# Patient Record
Sex: MALE | Race: WHITE | Employment: FULL TIME | URBAN - METROPOLITAN AREA
[De-identification: names, ages, dates, MRNs, and addresses within clinical notes are randomized per-mention and may not be internally consistent; named-entity substitution may affect disease eponyms.]

---

## 2017-03-17 DIAGNOSIS — E03.9 HYPOTHYROIDISM: ICD-10-CM

## 2017-03-19 RX ORDER — LEVOTHYROXINE SODIUM 75 UG/1
TABLET ORAL
Qty: 90 TABLET | Refills: 0 | Status: SHIPPED
Start: 2017-03-19 | End: 2017-06-22

## 2017-03-20 NOTE — TELEPHONE ENCOUNTER
LEVOTHROID) 75 MCG       Last Written Prescription Date:  3/12/16  Last Fill Quantity: 90,   # refills: 3  Last Office Visit : 1/15/16  Future Office visit:  NONE

## 2017-06-22 DIAGNOSIS — E03.9 HYPOTHYROIDISM: ICD-10-CM

## 2017-06-22 RX ORDER — LEVOTHYROXINE SODIUM 75 UG/1
75 TABLET ORAL
Qty: 90 TABLET | Refills: 0 | Status: SHIPPED | OUTPATIENT
Start: 2017-06-22 | End: 2017-10-11

## 2017-06-22 NOTE — TELEPHONE ENCOUNTER
levothyroxine 75 MCG        Last Written Prescription Date:  3/19/17  Last Fill Quantity: 90,   # refills: 0  Last Office Visit : 1/15/16  Future Office visit:  NONE    Routing refill request to provider for review/approval because: OVER DUE MD APPT.   LETTERS SENT + 90 DAY RF  NO PT F/U

## 2017-10-11 DIAGNOSIS — E03.9 HYPOTHYROIDISM: ICD-10-CM

## 2017-10-14 NOTE — TELEPHONE ENCOUNTER
levothyroxine  75 MCG      Last Written Prescription Date:  6/22/17  Last Fill Quantity: 90,   # refills: 0  Last Office Visit : 1/15/16  Future Office visit:  NONE  Routing refill request to provider for review/approval because:  OVER DUE APPT. + 21 MOS   REMINDER SENT + 90 DAY RFs   RF?    NO APPT. F/U

## 2017-10-16 RX ORDER — LEVOTHYROXINE SODIUM 75 UG/1
75 TABLET ORAL DAILY
Qty: 90 TABLET | Refills: 0 | Status: SHIPPED | OUTPATIENT
Start: 2017-10-16 | End: 2018-01-19

## 2018-01-09 DIAGNOSIS — E03.9 HYPOTHYROIDISM: ICD-10-CM

## 2018-01-10 RX ORDER — LEVOTHYROXINE SODIUM 75 UG/1
75 TABLET ORAL DAILY
Qty: 90 TABLET | Refills: 0 | Status: CANCELLED | OUTPATIENT
Start: 2018-01-10

## 2018-01-10 NOTE — TELEPHONE ENCOUNTER
levothyroxine (SYNTHROID/LEVOTHROID) 75 MCG tablet   Last Written Prescription Date:  10/16/17  Last Fill Quantity: 90,   # refills: 0  Last Office Visit :1/15/16  Future Office visit:  None    Scheduling has been notified to contact the pt for appointment.      Routing refill request to provider for review/approval because:  lv > 1 yr

## 2018-01-19 DIAGNOSIS — E03.9 HYPOTHYROIDISM: ICD-10-CM

## 2018-01-19 RX ORDER — LEVOTHYROXINE SODIUM 75 UG/1
75 TABLET ORAL DAILY
Qty: 30 TABLET | Refills: 0 | Status: SHIPPED | OUTPATIENT
Start: 2018-01-19 | End: 2018-01-25

## 2018-01-24 ENCOUNTER — OFFICE VISIT (OUTPATIENT)
Dept: ENDOCRINOLOGY | Facility: CLINIC | Age: 58
End: 2018-01-24
Payer: COMMERCIAL

## 2018-01-24 VITALS
BODY MASS INDEX: 30.31 KG/M2 | HEIGHT: 65 IN | DIASTOLIC BLOOD PRESSURE: 89 MMHG | WEIGHT: 181.9 LBS | SYSTOLIC BLOOD PRESSURE: 149 MMHG | HEART RATE: 60 BPM

## 2018-01-24 DIAGNOSIS — E05.00 GRAVES' EYE DISEASE: ICD-10-CM

## 2018-01-24 DIAGNOSIS — E89.0 POSTABLATIVE HYPOTHYROIDISM: Primary | ICD-10-CM

## 2018-01-24 DIAGNOSIS — E89.0 POSTABLATIVE HYPOTHYROIDISM: ICD-10-CM

## 2018-01-24 LAB
T4 FREE SERPL-MCNC: 0.9 NG/DL (ref 0.76–1.46)
TSH SERPL DL<=0.005 MIU/L-ACNC: 7.87 MU/L (ref 0.4–4)

## 2018-01-24 ASSESSMENT — PAIN SCALES - GENERAL: PAINLEVEL: NO PAIN (0)

## 2018-01-24 NOTE — NURSING NOTE
Chief Complaint   Patient presents with     RECHECK     ABNORMAL THYROID FUCTION     Chrissy Hernandez CMA

## 2018-01-24 NOTE — LETTER
1/24/2018       RE: Temo Jalloh  [address]  [place] [address]     Dear Colleague,    Thank you for referring your patient, Temo Jalloh, to the [facility] ENDOCRINOLOGY at [facility]. Please see a copy of my visit note below.    Endocrinology Clinic Visit 1/25/2018  NAME:  Temo Jalloh  PCP:  Sherin Crews  MRN:  [id number]  Reason for Consult: Graves disease, hypothyroidism  Requesting Provider:  Sherin Crews    Chief Complaint     Chief Complaint   Patient presents with     RECHECK     ABNORMAL THYROID FUCTION       History of Present Illness     Temo Jalloh is a 57 year old male who is seen in clinic for follow-up of Graves' disease and hypothyroidism.  Patient used to see Dr. Garsia who recently retired.  He was diagnosed with Graves' disease in 2013, and was treated with 22 mCi of radioactive iodine in August 2013, shortly after diagnosis.  He developed post ablative hypothyroidism and was started on levothyroxine, which she has been taking since at a dose of 75 mcg daily.  Last visit in    Also the patient has a history of Graves' ophthalmopathy which he had at the time of his original diagnosis.  He was initially treated with selenium, however over time he stopped it because he did not think it was helping.  His ophthalmopathy presented as proptosis and diplopia.  He received steroid courses over the last few years.  He was referred to ophthalmology ([facility]) but never went there because he was always feeling better by the time the referral was entered.     Last visit with Dr. Garsia was January 2016.  At that time he reported some diplopia, and was prescribed a prednisone taper over 2 weeks.  He tells me today that he felt better and his diplopia resolved with the prednisone taper, however he did not follow through with his eye clinic appointment because of resolution of his symptoms.  Since then he has not had  any recurrent diplopia.  He uses eyedrops as needed.  He can close his eyes completely during sleep.     Last TSH was 2.92 in January 2016.  That is reflective of levothyroxine 75 mcg daily with good compliance.    He feels fine today.  He has no complaints.  He has maintained a stable weight.  The patient denies any feeling cold, constipation, slowed thinking, dry skin, anxiousness, tremulousness, palpitations, sweating and hair loss.    No recent history of febrile illness with neck pain or sore throat.     Localized symptoms: there is no throat swelling, trouble swallowing, no SOB when lying flat, and no voice changes.     Problem List     Patient Active Problem List   Diagnosis     Allergic rhinitis     LEFT AC ARTHROSIS     Sensorineural hearing loss, asymmetrical     CARDIOVASCULAR SCREENING; LDL GOAL LESS THAN 160     Hyperthyroidism     Graves' eye disease     Hypothyroidism, postablative        Medications     Current Outpatient Prescriptions   Medication     levothyroxine (SYNTHROID/LEVOTHROID) 75 MCG tablet     predniSONE (DELTASONE) 20 MG tablet     Selenium ([facility] SELENIUM) 100 MCG TABS     [DISCONTINUED] NO ACTIVE MEDICATIONS     No current facility-administered medications for this visit.         Allergies     No Known Allergies    Medical / Surgical History     Past Medical History:   Diagnosis Date     Allergic rhinitis, cause unspecified      Hyperthyroidism 5/23/2013     Injury, other and unspecified, shoulder and upper arm     Left     Other postablative hypothyroidism 12/7/2013     Plantar fascial fibromatosis      Venereal disease, unspecified     Venereal warts     No past surgical history on file.    Social History     Social History     Social History     Marital status:      Spouse name: N/A     Number of children: N/A     Years of education: N/A     Occupational History     Not on file.     Social History Main Topics     Smoking status: Never Smoker     Smokeless tobacco: Never Used  "    Alcohol use Yes      Comment: 2 times weekly     Drug use: No     Sexual activity: Not on file     Other Topics Concern     Parent/Sibling W/ Cabg, Mi Or Angioplasty Before 65f 55m? Yes     father has 5 stents; starting at age 55     Social History Narrative       Family History     Family History   Problem Relation Age of Onset     C.A.D. Father      Hypertension Father      DIABETES Father      Breast Cancer No family hx of      Cancer - colorectal No family hx of      Thyroid Disease Mother      Thyroid Disease Sister      Thyroid Disease Maternal Uncle      Thyroid Disease Other      two nieces       ROS     Constitutional: no fevers, chills, night sweats. No weight loss/gain. No fatigue. Good appetite  Eyes: no vision changes, no eye redness, no diplopia  Ears, Nose, mouth, throat: no hearing changes, no tinnitus, no rhinorrhea, no nasal congestion, no anosmia  Cardiovascular: no chest pain, no orthopnea or PND, no edema, no palpitations  Respiratory: no dyspnea, no cough, no sputum, no wheezing  Gastrointestinal: no nausea, no vomiting, no abdominal pain, no diarrhea, no constipation  Genitourinary: no dysuria, no frequency, no urgency, no nocturia  Musculoskeletal: no joint pains, no back pain, no cramps, no fractures  Skin: no rash, no itching, no dryness, no ulcers, no hair loss, no nail changes  Neurological:no headaches, no weakness, no numbness, no tingling, no tremors, no difficulty sleeping, no snoring, no AM sleepiness  Psychiatric: no anxiety, no sadness  Hematologic/lymphatic: no easy bruising, no bleeding, no palor    Physical Exam   /89  Pulse 60  Ht 1.651 m (5' 5\")  Wt 82.5 kg (181 lb 14.4 oz)  BMI 30.27 kg/m2     General: Comfortable, no obvious distress, normal body habitus  Eyes: Sclera anicteric, moist conjunctiva, no lid lag, no exophthalmos  HENT: Atraumatic, oropharynx clear, moist mucous membranes with no mucosal ulcerations  Neck: Trachea midline, supple. Thyroid: Thyroid " is normal in size and texture  CV: Regular rhythm, normal rate. No murmurs auscultated  Resp: Clear to auscultation bilaterally, good effort  Abdomen:  Soft, non tender, non distended. Bowel sounds heard. No organomegaly. No striae  Skin: No rashes, lesions, or subcutaneous nodules.   Psych: Alert and oriented x 3. Appropriate affect, good insight  Extremities: No peripheral edema  Musculoskeletal: Appropriate muscle bulk and strength  Lymphatic: No cervical lymphadenopathy  Neuro: Moves all four extremities. No focal deficits on limited exam. Gait normal. No resting tremor, deep tendon reflexes with normal relaxation phase.     Labs/Imaging     Pertinent Labs were reviewed and updated in [facility].  Radiology Results were  reviewed and updated in EPIC.    Summary of recent findings:     TSH   Date Value Ref Range Status   01/24/2018 7.87 (H) 0.40 - 4.00 mU/L Final   01/15/2016 2.92 0.40 - 4.00 mU/L Final   03/20/2015 2.10 0.40 - 4.00 mU/L Final     Comment:     Effective 7/30/2014, the reference range for this assay has changed to reflect   new instrumentation/methodology.     07/24/2014 1.49 0.4 - 5.0 mU/L Final   06/09/2014 1.45 0.4 - 5.0 mU/L Final     T4 Free   Date Value Ref Range Status   01/24/2018 0.90 0.76 - 1.46 ng/dL Final   01/15/2016 1.05 0.76 - 1.46 ng/dL Final   03/20/2015 0.93 0.76 - 1.46 ng/dL Final     Comment:     Effective 7/30/2014, the reference range for this assay has changed to reflect   new instrumentation/methodology.     06/09/2014 1.11 0.70 - 1.85 ng/dL Final   04/07/2014 0.84 0.70 - 1.85 ng/dL Final       Impression / Plan     1. (E89.0) Postablative hypothyroidism  (primary encounter diagnosis)  Comment: He is taking 75 mcg of levothyroxine.  Likely euthyroid.  We will check his labs today.  Plan: TSH with free T4 reflex    (E05.00) Graves' eye disease  Comment: He has required 3 bursts of steroids since 2013, due to diplopia.  Has never been seen in the Graves' eye clinic.  Is currently  asymptomatic for the past 2 years.  Plan: I advised the patient to let me know if he has recurrence of any eye symptoms including pain, pressure, diplopia, more bulging, tearing, inability to close the eyes completely.  At that point I will refer him to the Graves' eye clinic.      Test and/or medications prescribed today:  Orders Placed This Encounter   Procedures     TSH with free T4 reflex         Follow up: 1 year      Clive Teague MD  Endocrinology, Diabetes and Metabolism  [facility]

## 2018-01-24 NOTE — MR AVS SNAPSHOT
After Visit Summary   1/24/2018    Temo Jalloh    MRN: [id number]           Patient Information     Date Of Birth          1960        Visit Information        Provider Department      1/24/2018 3:40 PM Clive Teague MD [facility] Endocrinology        Today's Diagnoses     Postablative hypothyroidism    -  1       Follow-ups after your visit        Follow-up notes from your care team     Return in about 1 year (around 1/24/2019).      Future tests that were ordered for you today     Open Future Orders        Priority Expected Expires Ordered    TSH with free T4 reflex Routine  1/24/2019 1/24/2018            Who to contact     Please call your clinic at [phone] to:    Ask questions about your health    Make or cancel appointments    Discuss your medicines    Learn about your test results    Speak to your doctor   If you have compliments or concerns about an experience at your clinic, or if you wish to file a complaint, please contact [facility] Patient Relations at [phone] or email us at [email or web address]         Additional Information About Your Visit        [employer] Information     Vivo gives you secure access to your electronic health record. If you see a primary care provider, you can also send messages to your care team and make appointments. If you have questions, please call your primary care clinic.  If you do not have a primary care provider, please call [phone] and they will assist you.      Vivo is an electronic gateway that provides easy, online access to your medical records. With Vivo, you can request a clinic appointment, read your test results, renew a prescription or communicate with your care team.     To access your existing account, please contact your [facility] or call [phone] for assistance.        Care EveryWhere ID     This is your Care EveryWhere ID. This could be  "used by other organizations to access your [place] medical records  [id number]        Your Vitals Were     Pulse Height BMI (Body Mass Index)             60 1.651 m (5' 5\") 30.27 kg/m2          Blood Pressure from Last 3 Encounters:   01/24/18 149/89   01/15/16 126/78   03/19/15 126/80    Weight from Last 3 Encounters:   01/24/18 82.5 kg (181 lb 14.4 oz)   01/15/16 81.4 kg (179 lb 8 oz)   03/19/15 80 kg (176 lb 4.8 oz)               Primary Care Provider Office Phone # Fax #    Sherin Crews, ANGEL [phone] [address] [facility] [address]        Equal Access to Services     YESICA STOKES : Hadii aad ku hadasho Soomaali, waaxda luqadaha, qaybta kaalmada adeegyada, fozia del toro . So [facility] [phone].    ATENCIÓN: Si habla español, tiene a perez disposición servicios gratuitos de asistencia lingüística. Ll[facility] [phone].    We comply with applicable federal civil rights laws and [place] laws. We do not discriminate on the basis of race, color, national origin, age, disability, sex, sexual orientation, or gender identity.            Thank you!     Thank you for choosing [facility] ENDOCRINOLOGY  for your care. Our goal is always to provide you with excellent care. Hearing back from our patients is one way we can continue to improve our services. Please take a few minutes to complete the written survey that you may receive in the mail after your visit with us. Thank you!             Your Updated Medication List - Protect others around you: Learn how to safely use, store and throw away your medicines at www.disposemymeds.org.          This list is accurate as of 1/24/18  3:52 PM.  Always use your most recent med list.                   Brand Name Dispense Instructions for use Diagnosis    levothyroxine 75 MCG tablet    SYNTHROID/LEVOTHROID    30 tablet    Take 1 tablet (75 mcg) by mouth daily    Hypothyroidism       predniSONE 20 MG tablet    DELTASONE    40 " tablet    Take 2 tabs (40 mg) every am for 1 week; then 1.5 tab (30 mg) daily for 1 week; then 1 tab (20 mg) daily for 1 week; then 0.5 tab (10 mg) daily for one week then stop.    Graves' eye disease       Selenium 100 MCG Tabs    [facility] SELENIUM    180 tablet    Take 100 mcg by mouth 2 times daily    Graves' eye disease, Hyperthyroidism

## 2018-01-25 DIAGNOSIS — E89.0 HYPOTHYROIDISM, POSTABLATIVE: Primary | ICD-10-CM

## 2018-01-25 RX ORDER — LEVOTHYROXINE SODIUM 88 UG/1
88 TABLET ORAL DAILY
Qty: 90 TABLET | Refills: 3 | Status: SHIPPED | OUTPATIENT
Start: 2018-01-25 | End: 2018-04-11

## 2018-01-25 NOTE — PROGRESS NOTES
Endocrinology Clinic Visit 1/25/2018  NAME:  Temo Jalloh  PCP:  Eleonora Sherin Reyesn  MRN:  [id number]  Reason for Consult: Graves disease, hypothyroidism  Requesting Provider:  Sherin Crews    Chief Complaint     Chief Complaint   Patient presents with     RECHECK     ABNORMAL THYROID FUCTION       History of Present Illness     Temo Jalloh is a 57 year old male who is seen in clinic for follow-up of Graves' disease and hypothyroidism.  Patient used to see Dr. Garsia who recently retired.  He was diagnosed with Graves' disease in 2013, and was treated with 22 mCi of radioactive iodine in August 2013, shortly after diagnosis.  He developed post ablative hypothyroidism and was started on levothyroxine, which she has been taking since at a dose of 75 mcg daily.  Last visit in    Also the patient has a history of Graves' ophthalmopathy which he had at the time of his original diagnosis.  He was initially treated with selenium, however over time he stopped it because he did not think it was helping.  His ophthalmopathy presented as proptosis and diplopia.  He received steroid courses over the last few years.  He was referred to ophthalmology ([facility]) but never went there because he was always feeling better by the time the referral was entered.     Last visit with Dr. Garsia was January 2016.  At that time he reported some diplopia, and was prescribed a prednisone taper over 2 weeks.  He tells me today that he felt better and his diplopia resolved with the prednisone taper, however he did not follow through with his eye clinic appointment because of resolution of his symptoms.  Since then he has not had any recurrent diplopia.  He uses eyedrops as needed.  He can close his eyes completely during sleep.     Last TSH was 2.92 in January 2016.  That is reflective of levothyroxine 75 mcg daily with good compliance.    He feels fine today.  He has no complaints.  He has maintained a stable  weight.  The patient denies any feeling cold, constipation, slowed thinking, dry skin, anxiousness, tremulousness, palpitations, sweating and hair loss.    No recent history of febrile illness with neck pain or sore throat.     Localized symptoms: there is no throat swelling, trouble swallowing, no SOB when lying flat, and no voice changes.     Problem List     Patient Active Problem List   Diagnosis     Allergic rhinitis     LEFT AC ARTHROSIS     Sensorineural hearing loss, asymmetrical     CARDIOVASCULAR SCREENING; LDL GOAL LESS THAN 160     Hyperthyroidism     Graves' eye disease     Hypothyroidism, postablative        Medications     Current Outpatient Prescriptions   Medication     levothyroxine (SYNTHROID/LEVOTHROID) 75 MCG tablet     predniSONE (DELTASONE) 20 MG tablet     Selenium ([facility]S SELENIUM) 100 MCG TABS     [DISCONTINUED] NO ACTIVE MEDICATIONS     No current facility-administered medications for this visit.         Allergies     No Known Allergies    Medical / Surgical History     Past Medical History:   Diagnosis Date     Allergic rhinitis, cause unspecified      Hyperthyroidism 5/23/2013     Injury, other and unspecified, shoulder and upper arm     Left     Other postablative hypothyroidism 12/7/2013     Plantar fascial fibromatosis      Venereal disease, unspecified     Venereal warts     No past surgical history on file.    Social History     Social History     Social History     Marital status:      Spouse name: N/A     Number of children: N/A     Years of education: N/A     Occupational History     Not on file.     Social History Main Topics     Smoking status: Never Smoker     Smokeless tobacco: Never Used     Alcohol use Yes      Comment: 2 times weekly     Drug use: No     Sexual activity: Not on file     Other Topics Concern     Parent/Sibling W/ Cabg, Mi Or Angioplasty Before 65f 55m? Yes     father has 5 stents; starting at age 55     Social History Narrative       Family History  "    Family History   Problem Relation Age of Onset     C.A.D. Father      Hypertension Father      DIABETES Father      Breast Cancer No family hx of      Cancer - colorectal No family hx of      Thyroid Disease Mother      Thyroid Disease Sister      Thyroid Disease Maternal Uncle      Thyroid Disease Other      two nieces       ROS     Constitutional: no fevers, chills, night sweats. No weight loss/gain. No fatigue. Good appetite  Eyes: no vision changes, no eye redness, no diplopia  Ears, Nose, mouth, throat: no hearing changes, no tinnitus, no rhinorrhea, no nasal congestion, no anosmia  Cardiovascular: no chest pain, no orthopnea or PND, no edema, no palpitations  Respiratory: no dyspnea, no cough, no sputum, no wheezing  Gastrointestinal: no nausea, no vomiting, no abdominal pain, no diarrhea, no constipation  Genitourinary: no dysuria, no frequency, no urgency, no nocturia  Musculoskeletal: no joint pains, no back pain, no cramps, no fractures  Skin: no rash, no itching, no dryness, no ulcers, no hair loss, no nail changes  Neurological:no headaches, no weakness, no numbness, no tingling, no tremors, no difficulty sleeping, no snoring, no AM sleepiness  Psychiatric: no anxiety, no sadness  Hematologic/lymphatic: no easy bruising, no bleeding, no palor    Physical Exam   /89  Pulse 60  Ht 1.651 m (5' 5\")  Wt 82.5 kg (181 lb 14.4 oz)  BMI 30.27 kg/m2     General: Comfortable, no obvious distress, normal body habitus  Eyes: Sclera anicteric, moist conjunctiva, no lid lag, no exophthalmos  HENT: Atraumatic, oropharynx clear, moist mucous membranes with no mucosal ulcerations  Neck: Trachea midline, supple. Thyroid: Thyroid is normal in size and texture  CV: Regular rhythm, normal rate. No murmurs auscultated  Resp: Clear to auscultation bilaterally, good effort  Abdomen:  Soft, non tender, non distended. Bowel sounds heard. No organomegaly. No striae  Skin: No rashes, lesions, or subcutaneous nodules. "   Psych: Alert and oriented x 3. Appropriate affect, good insight  Extremities: No peripheral edema  Musculoskeletal: Appropriate muscle bulk and strength  Lymphatic: No cervical lymphadenopathy  Neuro: Moves all four extremities. No focal deficits on limited exam. Gait normal. No resting tremor, deep tendon reflexes with normal relaxation phase.     Labs/Imaging     Pertinent Labs were reviewed and updated in [facility].  Radiology Results were  reviewed and updated in EPIC.    Summary of recent findings:     TSH   Date Value Ref Range Status   01/24/2018 7.87 (H) 0.40 - 4.00 mU/L Final   01/15/2016 2.92 0.40 - 4.00 mU/L Final   03/20/2015 2.10 0.40 - 4.00 mU/L Final     Comment:     Effective 7/30/2014, the reference range for this assay has changed to reflect   new instrumentation/methodology.     07/24/2014 1.49 0.4 - 5.0 mU/L Final   06/09/2014 1.45 0.4 - 5.0 mU/L Final     T4 Free   Date Value Ref Range Status   01/24/2018 0.90 0.76 - 1.46 ng/dL Final   01/15/2016 1.05 0.76 - 1.46 ng/dL Final   03/20/2015 0.93 0.76 - 1.46 ng/dL Final     Comment:     Effective 7/30/2014, the reference range for this assay has changed to reflect   new instrumentation/methodology.     06/09/2014 1.11 0.70 - 1.85 ng/dL Final   04/07/2014 0.84 0.70 - 1.85 ng/dL Final       Impression / Plan     1. (E89.0) Postablative hypothyroidism  (primary encounter diagnosis)  Comment: He is taking 75 mcg of levothyroxine.  Likely euthyroid.  We will check his labs today.  Plan: TSH with free T4 reflex    (E05.00) Graves' eye disease  Comment: He has required 3 bursts of steroids since 2013, due to diplopia.  Has never been seen in the Graves' eye clinic.  Is currently asymptomatic for the past 2 years.  Plan: I advised the patient to let me know if he has recurrence of any eye symptoms including pain, pressure, diplopia, more bulging, tearing, inability to close the eyes completely.  At that point I will refer him to the Graves' eye  clinic.      Test and/or medications prescribed today:  Orders Placed This Encounter   Procedures     TSH with free T4 reflex         Follow up: 1 year      Clive Teague MD  Endocrinology, Diabetes and Metabolism  [facility]

## 2018-02-27 ENCOUNTER — HOSPITAL ENCOUNTER (EMERGENCY)
Facility: CLINIC | Age: 58
Discharge: HOME | End: 2018-02-27
Attending: EMERGENCY MEDICINE | Admitting: EMERGENCY MEDICINE
Payer: COMMERCIAL

## 2018-02-27 VITALS
SYSTOLIC BLOOD PRESSURE: 145 MMHG | WEIGHT: 181.88 LBS | BODY MASS INDEX: 27.57 KG/M2 | OXYGEN SATURATION: 98 % | RESPIRATION RATE: 18 BRPM | TEMPERATURE: 98.8 F | HEIGHT: 68 IN | DIASTOLIC BLOOD PRESSURE: 98 MMHG

## 2018-02-27 DIAGNOSIS — M25.512 ACUTE PAIN OF LEFT SHOULDER: ICD-10-CM

## 2018-02-27 DIAGNOSIS — R07.9 ACUTE CHEST PAIN: ICD-10-CM

## 2018-02-27 LAB
ALBUMIN SERPL-MCNC: 4.1 G/DL (ref 3.4–5)
ALP SERPL-CCNC: 68 U/L (ref 40–150)
ALT SERPL W P-5'-P-CCNC: 35 U/L (ref 0–70)
ANION GAP SERPL CALCULATED.3IONS-SCNC: 6 MMOL/L (ref 3–14)
AST SERPL W P-5'-P-CCNC: 19 U/L (ref 0–45)
BASOPHILS # BLD AUTO: 0 10E9/L (ref 0–0.2)
BASOPHILS NFR BLD AUTO: 0.3 %
BILIRUB SERPL-MCNC: 1.7 MG/DL (ref 0.2–1.3)
BUN SERPL-MCNC: 20 MG/DL (ref 7–30)
CALCIUM SERPL-MCNC: 9.1 MG/DL (ref 8.5–10.1)
CHLORIDE SERPL-SCNC: 105 MMOL/L (ref 94–109)
CO2 SERPL-SCNC: 28 MMOL/L (ref 20–32)
CREAT SERPL-MCNC: 1.26 MG/DL (ref 0.66–1.25)
DIFFERENTIAL METHOD BLD: NORMAL
EOSINOPHIL # BLD AUTO: 0.1 10E9/L (ref 0–0.7)
EOSINOPHIL NFR BLD AUTO: 0.8 %
ERYTHROCYTE [DISTWIDTH] IN BLOOD BY AUTOMATED COUNT: 12.7 % (ref 10–15)
GFR SERPL CREATININE-BSD FRML MDRD: 59 ML/MIN/1.7M2
GLUCOSE SERPL-MCNC: 89 MG/DL (ref 70–99)
HCT VFR BLD AUTO: 45.5 % (ref 40–53)
HGB BLD-MCNC: 15.8 G/DL (ref 13.3–17.7)
IMM GRANULOCYTES # BLD: 0 10E9/L (ref 0–0.4)
IMM GRANULOCYTES NFR BLD: 0.2 %
LYMPHOCYTES # BLD AUTO: 1.2 10E9/L (ref 0.8–5.3)
LYMPHOCYTES NFR BLD AUTO: 19.7 %
MCH RBC QN AUTO: 30.7 PG (ref 26.5–33)
MCHC RBC AUTO-ENTMCNC: 34.7 G/DL (ref 31.5–36.5)
MCV RBC AUTO: 88 FL (ref 78–100)
MONOCYTES # BLD AUTO: 0.5 10E9/L (ref 0–1.3)
MONOCYTES NFR BLD AUTO: 8.1 %
NEUTROPHILS # BLD AUTO: 4.3 10E9/L (ref 1.6–8.3)
NEUTROPHILS NFR BLD AUTO: 70.9 %
PLATELET # BLD AUTO: 191 10E9/L (ref 150–450)
POTASSIUM SERPL-SCNC: 3.7 MMOL/L (ref 3.4–5.3)
PROT SERPL-MCNC: 7.7 G/DL (ref 6.8–8.8)
RBC # BLD AUTO: 5.15 10E12/L (ref 4.4–5.9)
SODIUM SERPL-SCNC: 139 MMOL/L (ref 133–144)
TROPONIN I SERPL-MCNC: <0.015 UG/L (ref 0–0.04)
WBC # BLD AUTO: 6 10E9/L (ref 4–11)

## 2018-02-27 PROCEDURE — 84484 ASSAY OF TROPONIN QUANT: CPT | Performed by: EMERGENCY MEDICINE

## 2018-02-27 PROCEDURE — 85025 COMPLETE CBC W/AUTO DIFF WBC: CPT | Performed by: EMERGENCY MEDICINE

## 2018-02-27 PROCEDURE — 99284 EMERGENCY DEPT VISIT MOD MDM: CPT | Performed by: EMERGENCY MEDICINE

## 2018-02-27 PROCEDURE — 99284 EMERGENCY DEPT VISIT MOD MDM: CPT | Mod: Z6 | Performed by: EMERGENCY MEDICINE

## 2018-02-27 PROCEDURE — 80053 COMPREHEN METABOLIC PANEL: CPT | Performed by: EMERGENCY MEDICINE

## 2018-02-27 PROCEDURE — 93010 ELECTROCARDIOGRAM REPORT: CPT | Mod: Z6 | Performed by: EMERGENCY MEDICINE

## 2018-02-27 PROCEDURE — 99281 EMR DPT VST MAYX REQ PHY/QHP: CPT

## 2018-02-27 PROCEDURE — 93005 ELECTROCARDIOGRAM TRACING: CPT | Performed by: EMERGENCY MEDICINE

## 2018-02-27 NOTE — ED AVS SNAPSHOT
[facility] Emergency Department    [address] [facility] [address]    Phone:  [phone]    Fax:  [phone]                                       Temo Jalloh   MRN: [id number]    Department:  [facility] Emergency Department   Date of Visit:  2/27/2018           Patient Information     Date Of Birth          1960        Your diagnoses for this visit were:     Acute chest pain        You were seen by Ezequiel Kingsley MD.      Follow-up Information     Follow up with [facility] Emergency Department.    Specialty:  EMERGENCY MEDICINE    Why:  If symptoms worsen, or new problems or concerns.    Contact information:    [address] [place] [id number] [phone]    Additional information:    The medical center is located at   [address] [place] [address] (between [facility] and   [facility] in [place], four miles north   of [place]).        Schedule an appointment as soon as possible for a visit with Sherin Crews NP.    Specialty:  Nurse Practitioner - Family    Why:  For follow-up and recheck after stress testing    Contact information:    [address] [place] [address]  [phone]          Discharge Instructions          *CHEST PAIN, UNCERTAIN CAUSE    Based on your exam today, the exact cause of your chest pain is not certain. Your condition does not seem serious at this time, and your pain does not appear to be coming from your heart. However, sometimes the signs of a serious problem take more time to appear. Therefore, watch for the warning signs listed below.  HOME CARE:  1. Rest today and avoid strenuous activity.  2. Take any prescribed medicine as directed.  FOLLOW UP with your doctor in 1-3 days.   GET PROMPT MEDICAL ATTENTION if any of the following occur:    A change in the type of pain: if it feels different, becomes more severe, lasts longer, or begins to spread into your shoulder, arm, neck, jaw or back    Shortness of breath or increased  pain with breathing    Weakness, dizziness, or fainting    Cough with blood or dark colored sputum (phlegm)    Fever over 101  F (38.3  C)    Swelling, pain or redness in one leg    7200-2563 [employer]. [address] [place] [address]. All rights reserved. This information is not intended as a substitute for professional medical care. Always follow your healthcare professional's instructions.  This information has been modified by your health care provider with permission from the publisher.      *CHEST PAIN, NONCARDIAC    Based on your visit today, the exact cause of your chest pain is not certain. Your condition does not seem serious and your pain does not appear to be coming from your heart. However, sometimes the signs of a serious problem take more time to appear. Therefore, please watch for the warning signs listed below.  HOME CARE:  3. Rest today and avoid strenuous activity.  4. Take any prescribed medicine as directed.  FOLLOW UP with your doctor in 1-3 days.   GET PROMPT MEDICAL ATTENTION if any of the following occur:    A change in the type of pain: if it feels different, becomes more severe, lasts longer, or begins to spread into your shoulder, arm, neck, jaw or back    Shortness of breath or increased pain with breathing    Cough with blood or dark colored sputum (phlegm)    Weakness, dizziness, or fainting    Fever over 101  F (38.3  C)    Swelling, pain or redness in one leg    3730-5785 [employer] [address] [place] [address]. All rights reserved. This information is not intended as a substitute for professional medical care. Always follow your healthcare professional's instructions.  This information has been modified by your health care provider with permission from the publisher.      Discharge References/Attachments     ANGINA, WHAT IS (ENGLISH)      24 Hour Appointment Hotline       To make an appointment at any [facility], call  [phone] ([phone]). If you don't have a family doctor or clinic, we will help you find one. [place] clinics are conveniently located to serve the needs of you and your family.          ED Discharge Orders     NM Exercise stress test                    Review of your medicines      Our records show that you are taking the medicines listed below. If these are incorrect, please call your family doctor or clinic.        Dose / Directions Last dose taken    levothyroxine 88 MCG tablet   Commonly known as:  SYNTHROID/LEVOTHROID   Dose:  88 mcg   Quantity:  90 tablet        Take 1 tablet (88 mcg) by mouth daily   Refills:  3                Procedures and tests performed during your visit     CBC with platelets differential    Comprehensive metabolic panel    EKG 12-lead, tracing only    Peripheral IV catheter    Troponin I      Orders Needing Specimen Collection     None      Pending Results     No orders found from 2/25/2018 to 2/28/2018.            Pending Culture Results     No orders found from 2/25/2018 to 2/28/2018.            Pending Results Instructions     If you had any lab results that were not finalized at the time of your Discharge, you can call the ED Lab Result RN at [phone]. You will be contacted by this team for any positive Lab results or changes in treatment. The nurses are available 7 days a week from 10A to 6:30P.  You can leave a message 24 hours per day and they will return your call.        Test Results From Your Hospital Stay        2/27/2018  2:49 PM      Component Results     Component Value Ref Range & Units Status    WBC 6.0 4.0 - 11.0 10e9/L Final    RBC Count 5.15 4.4 - 5.9 10e12/L Final    Hemoglobin 15.8 13.3 - 17.7 g/dL Final    Hematocrit 45.5 40.0 - 53.0 % Final    MCV 88 78 - 100 fl Final    MCH 30.7 26.5 - 33.0 pg Final    MCHC 34.7 31.5 - 36.5 g/dL Final    RDW 12.7 10.0 - 15.0 % Final    Platelet Count 191 150 - 450 10e9/L Final    Diff Method Automated Method   Final    % Neutrophils 70.9 % Final    % Lymphocytes 19.7 % Final    % Monocytes 8.1 % Final    % Eosinophils 0.8 % Final    % Basophils 0.3 % Final    % Immature Granulocytes 0.2 % Final    Absolute Neutrophil 4.3 1.6 - 8.3 10e9/L Final    Absolute Lymphocytes 1.2 0.8 - 5.3 10e9/L Final    Absolute Monocytes 0.5 0.0 - 1.3 10e9/L Final    Absolute Eosinophils 0.1 0.0 - 0.7 10e9/L Final    Absolute Basophils 0.0 0.0 - 0.2 10e9/L Final    Abs Immature Granulocytes 0.0 0 - 0.4 10e9/L Final         2/27/2018  3:15 PM      Component Results     Component Value Ref Range & Units Status    Sodium 139 133 - 144 mmol/L Final    Potassium 3.7 3.4 - 5.3 mmol/L Final    Chloride 105 94 - 109 mmol/L Final    Carbon Dioxide 28 20 - 32 mmol/L Final    Anion Gap 6 3 - 14 mmol/L Final    Glucose 89 70 - 99 mg/dL Final    Urea Nitrogen 20 7 - 30 mg/dL Final    Creatinine 1.26 (H) 0.66 - 1.25 mg/dL Final    GFR Estimate 59 (L) >60 mL/min/1.7m2 Final    Non  GFR Calc    GFR Estimate If Black 71 >60 mL/min/1.7m2 Final    African American GFR Calc    Calcium 9.1 8.5 - 10.1 mg/dL Final    Bilirubin Total 1.7 (H) 0.2 - 1.3 mg/dL Final    Albumin 4.1 3.4 - 5.0 g/dL Final    Protein Total 7.7 6.8 - 8.8 g/dL Final    Alkaline Phosphatase 68 40 - 150 U/L Final    ALT 35 0 - 70 U/L Final    AST 19 0 - 45 U/L Final         2/27/2018  3:16 PM      Component Results     Component Value Ref Range & Units Status    Troponin I ES <0.015 0.000 - 0.045 ug/L Final    The 99th percentile for upper reference range is 0.045 ug/L.  Troponin values   in the range of 0.045 - 0.120 ug/L may be associated with risks of adverse   clinical events.                  Thank you for choosing [facility]       Thank you for choosing [facility] for your care. Our goal is always to provide you with excellent care. Hearing back from our patients is one way we can continue to improve our services. Please take a few minutes to complete the written survey that you  may receive in the mail after you visit with us. Thank you!        [employer] Information     [employer] gives you secure access to your electronic health record. If you see a primary care provider, you can also send messages to your care team and make appointments. If you have questions, please call your primary care clinic.  If you do not have a primary care provider, please call [phone] and they will assist you.        Care EveryWhere ID     This is your Care EveryWhere ID. This could be used by other organizations to access your [facility] medical records  [id number]        Equal Access to Services     [facility] Center[facility]ST[facility] : Sammie Koch, funmi flor, pina solis, fozia alvarado. [facility] [phone].    ATENCIÓN: Si habla español, tiene a perez disposición servicios gratuitos de asistencia lingüística. Garry al [phone].    We comply with applicable federal civil rights laws and [place] laws. We do not discriminate on the basis of race, color, national origin, age, disability, sex, sexual orientation, or gender identity.            After Visit Summary       This is your record. Keep this with you and show to your community pharmacist(s) and doctor(s) at your next visit.

## 2018-02-27 NOTE — ED AVS SNAPSHOT
[facility] Emergency Department    [address] [facility] [address]    Phone:  [phone]    Fax:  [phone]                                       Temo Jalloh   MRN: [id number]    Department:  [facility] Emergency Department   Date of Visit:  2/27/2018           After Visit Summary Signature Page     I have received my discharge instructions, and my questions have been answered. I have discussed any challenges I see with this plan with the nurse or doctor.    ..........................................................................................................................................  Patient/Patient Representative Signature      ..........................................................................................................................................  Patient Representative Print Name and Relationship to Patient    ..................................................               ................................................  Date                                            Time    ..........................................................................................................................................  Reviewed by Signature/Title    ...................................................              ..............................................  Date                                                            Time

## 2018-02-27 NOTE — DISCHARGE INSTRUCTIONS
*CHEST PAIN, UNCERTAIN CAUSE    Based on your exam today, the exact cause of your chest pain is not certain. Your condition does not seem serious at this time, and your pain does not appear to be coming from your heart. However, sometimes the signs of a serious problem take more time to appear. Therefore, watch for the warning signs listed below.  HOME CARE:  1. Rest today and avoid strenuous activity.  2. Take any prescribed medicine as directed.  FOLLOW UP with your doctor in 1-3 days.   GET PROMPT MEDICAL ATTENTION if any of the following occur:    A change in the type of pain: if it feels different, becomes more severe, lasts longer, or begins to spread into your shoulder, arm, neck, jaw or back    Shortness of breath or increased pain with breathing    Weakness, dizziness, or fainting    Cough with blood or dark colored sputum (phlegm)    Fever over 101  F (38.3  C)    Swelling, pain or redness in one leg    8911-1526 [employer]. [address] [place] [address]. All rights reserved. This information is not intended as a substitute for professional medical care. Always follow your healthcare professional's instructions.  This information has been modified by your health care provider with permission from the publisher.      *CHEST PAIN, NONCARDIAC    Based on your visit today, the exact cause of your chest pain is not certain. Your condition does not seem serious and your pain does not appear to be coming from your heart. However, sometimes the signs of a serious problem take more time to appear. Therefore, please watch for the warning signs listed below.  HOME CARE:  3. Rest today and avoid strenuous activity.  4. Take any prescribed medicine as directed.  FOLLOW UP with your doctor in 1-3 days.   GET PROMPT MEDICAL ATTENTION if any of the following occur:    A change in the type of pain: if it feels different, becomes more severe, lasts longer, or begins to spread into your  shoulder, arm, neck, jaw or back    Shortness of breath or increased pain with breathing    Cough with blood or dark colored sputum (phlegm)    Weakness, dizziness, or fainting    Fever over 101  F (38.3  C)    Swelling, pain or redness in one leg    8120-9287 [employer]. [address] [place] [address]. All rights reserved. This information is not intended as a substitute for professional medical care. Always follow your healthcare professional's instructions.  This information has been modified by your health care provider with permission from the publisher.

## 2018-02-27 NOTE — ED NOTES
Pt c/o left shoulder and arm pain - states was shoveling a couple days ago and developed the shoulder and arm pain AFTER shoveling - denies pain with strenuous exercise. Has taken ibuprofen with some relief although had an upset stomach today and took TUMS today with complete relief of chest pain. Patient c/o left pectoral chest pain radiating to left shoulder and arm with indigestion. Denies pain at this time - denies any precipitating or alleviating factors - shoulder just aches. Patient states today with shooting pain down arm past elbow and this was concerning for him. Of note, patient has Graves disease and recently increased his thyroid medications.

## 2018-02-27 NOTE — ED PROVIDER NOTES
History     Chief Complaint   Patient presents with     Chest Pain     left shoulder pain yesterday .cp today     HPI  Temo Jalloh is a 57 year old male who developed insidious onset of left shoulder pain yesterday, improved with ibuprofen, and then insidious onset of nonexertional left lateral chest pain which resolved with antacid today.  He is recently been shoveling, moving an icehouse and building a wall in the past several days.  Mild dull aching pains of mild severity, nonradiating, nonpleuritic and without associated shortness of breath, nausea or diaphoresis/sweating.  The left shoulder pain was associated with 2 brief shooting pains in the left arm and hand.  No left arm weakness or paresthesia and no posterior neck pain or ability to elicit symptoms with neck movement.  No cough, hemoptysis, leg pain or leg swelling.  No other acute complaints or concerns.    Problem List:    Patient Active Problem List    Diagnosis Date Noted     Hypothyroidism, postablative 12/07/2013     Priority: Medium     Problem list name updated by automated process. Provider to review       Graves' eye disease 06/24/2013     Priority: Medium     extensive fam hx, eye disease and confirmed thyrotoxicosis         Hyperthyroidism 05/23/2013     Priority: Medium     Sensorineural hearing loss, asymmetrical 06/06/2011     Priority: Medium     CARDIOVASCULAR SCREENING; LDL GOAL LESS THAN 160 06/06/2011     Priority: Medium     Allergic rhinitis 05/02/2005     Priority: Medium     Problem list name updated by automated process. Provider to review       LEFT AC ARTHROSIS 05/02/2005     Priority: Medium     Left          Past Medical History:    Past Medical History:   Diagnosis Date     Allergic rhinitis, cause unspecified      Hyperthyroidism 5/23/2013     Injury, other and unspecified, shoulder and upper arm      Other postablative hypothyroidism 12/7/2013     Plantar fascial fibromatosis      Venereal disease, unspecified   "      Past Surgical History:    History reviewed. No pertinent surgical history.    Family History:    Family History   Problem Relation Age of Onset     C.A.D. Father      Hypertension Father      DIABETES Father      Breast Cancer No family hx of      Cancer - colorectal No family hx of      Thyroid Disease Mother      Thyroid Disease Sister      Thyroid Disease Maternal Uncle      Thyroid Disease Other      two nieces       Social History:  Marital Status:   [2]  Social History   Substance Use Topics     Smoking status: Never Smoker     Smokeless tobacco: Never Used     Alcohol use Yes      Comment: 2 times weekly        Medications:      levothyroxine (SYNTHROID/LEVOTHROID) 88 MCG tablet   [DISCONTINUED] NO ACTIVE MEDICATIONS         Review of Systems  Patient was provided instructions for supportive care and will return as needed for worsened condition or worsening symptoms, or new problems or concerns.    Physical Exam   BP: (!) 182/99  Heart Rate: 83  Temp: 98.8  F (37.1  C)  Resp: 16  Height: 172.7 cm (5' 8\")  Weight: 82.5 kg (181 lb 14.1 oz)  SpO2: 99 %      Physical Exam   Constitutional: He is oriented to person, place, and time. He appears well-developed and well-nourished. No distress.   HENT:   Head: Normocephalic and atraumatic.   Mouth/Throat: Oropharynx is clear and moist.   Eyes: Conjunctivae and EOM are normal.   Neck: Normal range of motion. Neck supple. No JVD present. No tracheal deviation present. No thyromegaly present.   Cardiovascular: Normal rate, regular rhythm, normal heart sounds and intact distal pulses.  Exam reveals no gallop and no friction rub.    No murmur heard.  Pulmonary/Chest: Effort normal and breath sounds normal. No respiratory distress. He has no wheezes. He has no rales. He exhibits no tenderness.   Abdominal: Soft. He exhibits no distension. There is no tenderness.   Musculoskeletal: Normal range of motion. He exhibits no edema or tenderness.   Neurological: He is " alert and oriented to person, place, and time. He has normal strength. No sensory deficit.   Skin: Skin is warm and dry. No rash noted. He is not diaphoretic. No erythema. No pallor.   Psychiatric: He has a normal mood and affect. His behavior is normal.   Nursing note and vitals reviewed.      ED Course     ED Course     Procedures               EKG Interpretation:      Interpreted by Ezequiel Kingsley MD  Time reviewed: 1505 pm  Symptoms at time of EKG: Left shoulder pain  Rhythm: normal sinus   Rate: normal  Axis: normal  Ectopy: none  Conduction: normal  ST Segments/ T Waves: No ST-T wave changes  Q Waves: none  Comparison to prior: Unchanged from 9/3/11  Clinical Impression: normal EKG    Results for orders placed or performed during the hospital encounter of 02/27/18   CBC with platelets differential   Result Value Ref Range    WBC 6.0 4.0 - 11.0 10e9/L    RBC Count 5.15 4.4 - 5.9 10e12/L    Hemoglobin 15.8 13.3 - 17.7 g/dL    Hematocrit 45.5 40.0 - 53.0 %    MCV 88 78 - 100 fl    MCH 30.7 26.5 - 33.0 pg    MCHC 34.7 31.5 - 36.5 g/dL    RDW 12.7 10.0 - 15.0 %    Platelet Count 191 150 - 450 10e9/L    Diff Method Automated Method     % Neutrophils 70.9 %    % Lymphocytes 19.7 %    % Monocytes 8.1 %    % Eosinophils 0.8 %    % Basophils 0.3 %    % Immature Granulocytes 0.2 %    Absolute Neutrophil 4.3 1.6 - 8.3 10e9/L    Absolute Lymphocytes 1.2 0.8 - 5.3 10e9/L    Absolute Monocytes 0.5 0.0 - 1.3 10e9/L    Absolute Eosinophils 0.1 0.0 - 0.7 10e9/L    Absolute Basophils 0.0 0.0 - 0.2 10e9/L    Abs Immature Granulocytes 0.0 0 - 0.4 10e9/L   Comprehensive metabolic panel   Result Value Ref Range    Sodium 139 133 - 144 mmol/L    Potassium 3.7 3.4 - 5.3 mmol/L    Chloride 105 94 - 109 mmol/L    Carbon Dioxide 28 20 - 32 mmol/L    Anion Gap 6 3 - 14 mmol/L    Glucose 89 70 - 99 mg/dL    Urea Nitrogen 20 7 - 30 mg/dL    Creatinine 1.26 (H) 0.66 - 1.25 mg/dL    GFR Estimate 59 (L) >60 mL/min/1.7m2    GFR Estimate If  Black 71 >60 mL/min/1.7m2    Calcium 9.1 8.5 - 10.1 mg/dL    Bilirubin Total 1.7 (H) 0.2 - 1.3 mg/dL    Albumin 4.1 3.4 - 5.0 g/dL    Protein Total 7.7 6.8 - 8.8 g/dL    Alkaline Phosphatase 68 40 - 150 U/L    ALT 35 0 - 70 U/L    AST 19 0 - 45 U/L   Troponin I   Result Value Ref Range    Troponin I ES <0.015 0.000 - 0.045 ug/L            Assessments & Plan (with Medical Decision Making)   57-year-old male left shoulder pain for several hours yesterday, then return of several hours of nonexertional left shoulder pain with 2 sharp brief shooting pains into the left arm and hand, and intermittent lateral left chest pains this afternoon.  EKG and troponin normal.  Symptoms atypical of ACS and I doubt ACS/MI, aneurysm/dissection, PE/DVT or other emergent cardiovascular or  disease process. ?  musculoskeletal left shoulder pain and chest pain, and possibly benign GI because of chest pain which improved with antacid at home.  I discussed the limitations of ED evaluation of chest pain with the patient and his wife and they understand that cardiac etiology of symptoms cannot be definitively ruled out, but they under understand and accept this and are comfortable discharge home with plan for outpatient stress testing in primary care clinic follow-up.  I offered ED admission for observation and serial enzyme evaluation, but he declined.  He appears to be at low risk for significant adverse event and appears appropriate for discharge with outpatient follow-up as above.  He was instructed to avoid any significant exertional activity until his outpatient nuclear stress test.  Patient was provided instructions for supportive care and will return as needed for worsened condition or worsening symptoms, or new problems or concerns.    I have reviewed the nursing notes.    I have reviewed the findings, diagnosis, plan and need for follow up with the patient.    New Prescriptions    ASA 81 mg po once daily       Final diagnoses:    Acute chest pain       2/27/2018   [facility] EMERGENCY DEPARTMENT     Ezequiel Kingsley MD  03/04/18 [id number]

## 2018-03-05 ENCOUNTER — HOSPITAL ENCOUNTER (OUTPATIENT)
Dept: NUCLEAR MEDICINE | Facility: CLINIC | Age: 58
Setting detail: NUCLEAR MEDICINE
Discharge: HOME | End: 2018-03-05
Attending: EMERGENCY MEDICINE | Admitting: EMERGENCY MEDICINE
Payer: COMMERCIAL

## 2018-03-05 DIAGNOSIS — R07.9 ACUTE CHEST PAIN: ICD-10-CM

## 2018-03-05 PROCEDURE — 93018 CV STRESS TEST I&R ONLY: CPT | Performed by: INTERNAL MEDICINE

## 2018-03-05 PROCEDURE — 34300033 ZZH RX 343: Performed by: EMERGENCY MEDICINE

## 2018-03-05 PROCEDURE — 78452 HT MUSCLE IMAGE SPECT MULT: CPT

## 2018-03-05 PROCEDURE — 93017 CV STRESS TEST TRACING ONLY: CPT

## 2018-03-05 PROCEDURE — A9502 TC99M TETROFOSMIN: HCPCS | Performed by: EMERGENCY MEDICINE

## 2018-03-05 PROCEDURE — 78452 HT MUSCLE IMAGE SPECT MULT: CPT | Mod: 26 | Performed by: INTERNAL MEDICINE

## 2018-03-05 PROCEDURE — 93016 CV STRESS TEST SUPVJ ONLY: CPT | Performed by: INTERNAL MEDICINE

## 2018-03-05 RX ADMIN — TETROFOSMIN 32.1 MCI.: 1.38 INJECTION, POWDER, LYOPHILIZED, FOR SOLUTION INTRAVENOUS at 14:35

## 2018-03-05 RX ADMIN — TETROFOSMIN 10 MCI.: 1.38 INJECTION, POWDER, LYOPHILIZED, FOR SOLUTION INTRAVENOUS at 13:11

## 2018-03-08 ENCOUNTER — OFFICE VISIT (OUTPATIENT)
Dept: FAMILY MEDICINE | Facility: CLINIC | Age: 58
End: 2018-03-08
Payer: COMMERCIAL

## 2018-03-08 VITALS
RESPIRATION RATE: 16 BRPM | DIASTOLIC BLOOD PRESSURE: 79 MMHG | TEMPERATURE: 98.4 F | HEIGHT: 68 IN | SYSTOLIC BLOOD PRESSURE: 131 MMHG | HEART RATE: 59 BPM | OXYGEN SATURATION: 97 % | WEIGHT: 182.2 LBS | BODY MASS INDEX: 27.61 KG/M2

## 2018-03-08 DIAGNOSIS — R79.89 ABNORMAL BLOOD CREATININE LEVEL: ICD-10-CM

## 2018-03-08 DIAGNOSIS — M25.512 CHRONIC LEFT SHOULDER PAIN: ICD-10-CM

## 2018-03-08 DIAGNOSIS — E89.0 HYPOTHYROIDISM, POSTABLATIVE: Primary | Chronic | ICD-10-CM

## 2018-03-08 DIAGNOSIS — G89.29 CHRONIC LEFT SHOULDER PAIN: ICD-10-CM

## 2018-03-08 DIAGNOSIS — Z13.6 CARDIOVASCULAR SCREENING; LDL GOAL LESS THAN 160: ICD-10-CM

## 2018-03-08 PROCEDURE — 99214 OFFICE O/P EST MOD 30 MIN: CPT | Performed by: NURSE PRACTITIONER

## 2018-03-08 ASSESSMENT — PAIN SCALES - GENERAL: PAINLEVEL: MODERATE PAIN (4)

## 2018-03-08 NOTE — NURSING NOTE
"Chief Complaint   Patient presents with     Results     pt. is here in clinic today to go over stess test results done 3/5/2018      Health Maintenance     pt. was reminded he is due for a colonoscopy      Shoulder left     pain ? pt. requesting cortisone injection       Initial /79  Pulse 59  Temp 98.4  F (36.9  C) (Tympanic)  Resp 16  Ht 5' 8\" (1.727 m)  Wt 182 lb 3.2 oz (82.6 kg)  SpO2 97%  BMI 27.7 kg/m2 Estimated body mass index is 27.7 kg/(m^2) as calculated from the following:    Height as of this encounter: 5' 8\" (1.727 m).    Weight as of this encounter: 182 lb 3.2 oz (82.6 kg).  Medication Reconciliation: complete     Savannah Jules, CMA      "

## 2018-03-08 NOTE — MR AVS SNAPSHOT
After Visit Summary   3/8/2018    Temo Jalloh    MRN: [id number]           Patient Information     Date Of Birth          1960        Visit Information        Provider Department      3/8/2018 3:40 PM Cate Birmingham APRN [facility]        Today's Diagnoses     Hypothyroidism, postablative    -  1    Abnormal blood creatinine level        CARDIOVASCULAR SCREENING; LDL GOAL LESS THAN 160        Chronic left shoulder pain          Care Instructions    Stress test was normal  Slightly abnormal kidney function this can be due to dehydration, or suing too much Ibuprofen. Try Tylenol instead of Ibuprofen. Try to avoid NSAID's (naproxen, ibuprofen).  Thyroid recheck    Schedule with ortho for Cortisol shot                             Follow-ups after your visit        Additional Services     ORTHO  REFERRAL       [facility] is referring you to the Orthopedic  Services at [facility].       The  Representative will assist you in the coordination of your Orthopedic and Musculoskeletal Care as prescribed by your physician.    The  Representative will call you within 1 business day to help schedule your appointment, or you may contact the  Representative at:    All areas ~ [phone]     Type of Referral : Non Surgical       Timeframe requested: 1 - 2 days    Coverage of these services is subject to the terms and limitations of your health insurance plan.  Please call member services at your health plan with any benefit or coverage questions.      If X-rays, CT or MRI's have been performed, please contact the facility where they were done to arrange for , prior to your scheduled appointment.  Please bring this referral request to your appointment and present it to your specialist.                  Future tests that were ordered for you today     Open Future Orders        Priority  "Expected Expires Ordered    Lipid panel reflex to direct LDL Fasting Routine 3/8/2018 3/8/2019 3/8/2018    **Creatinine FUTURE anytime Routine 3/8/2018 3/8/2019 3/8/2018    TSH Routine  3/29/2018 3/8/2018    T4, free Routine  3/29/2018 3/8/2018            Who to contact     If you have questions or need follow up information about today's clinic visit or your schedule please contact [facility] directly at [phone].  Normal or non-critical lab and imaging results will be communicated to you by [employer], letter or phone within 4 business days after the clinic has received the results. If you do not hear from us within 7 days, please contact the clinic through [employer] or phone. If you have a critical or abnormal lab result, we will notify you by phone as soon as possible.  Submit refill requests through [employer] or call your pharmacy and they will forward the refill request to us. Please allow 3 business days for your refill to be completed.          Additional Information About Your Visit        [employer] Information     [employer] gives you secure access to your electronic health record. If you see a primary care provider, you can also send messages to your care team and make appointments. If you have questions, please call your primary care clinic.  If you do not have a primary care provider, please call [phone] and they will assist you.        Care EveryWhere ID     This is your Care EveryWhere ID. This could be used by other organizations to access your [facility] medical records  [id number]        Your Vitals Were     Pulse Temperature Respirations Height Pulse Oximetry BMI (Body Mass Index)    59 98.4  F (36.9  C) (Tympanic) 16 5' 8\" (1.727 m) 97% 27.7 kg/m2       Blood Pressure from Last 3 Encounters:   03/08/18 131/79   02/27/18 (!) 145/98   01/24/18 149/89    Weight from Last 3 Encounters:   03/08/18 182 lb 3.2 oz (82.6 kg)   02/27/18 181 lb 14.1 oz (82.5 kg)   01/24/18 181 lb 14.4 oz (82.5 " kg)              We Performed the Following     ORTHO  REFERRAL        Primary Care Provider Office Phone # Fax #    Sherin Crews, ANGEL [phone] [address] [address]        Equal Access to Services     YESICA STOKES : Hadii catalina ku hadlamonto Soomaali, waaxda luqadaha, qaybta kaalmada adeegyada, waxtramaine reedin haymercedesn javi ironregina alvarado. So [facility] [phone].    ATENCIÓN: Si habla español, tiene a perez disposición servicios gratuitos de asistencia lingüística. Llame al [phone].    We comply with applicable federal civil rights laws and [place] laws. We do not discriminate on the basis of race, color, national origin, age, disability, sex, sexual orientation, or gender identity.            Thank you!     Thank you for choosing [facility]  for your care. Our goal is always to provide you with excellent care. Hearing back from our patients is one way we can continue to improve our services. Please take a few minutes to complete the written survey that you may receive in the mail after your visit with us. Thank you!             Your Updated Medication List - Protect others around you: Learn how to safely use, store and throw away your medicines at www.disposemymeds.org.          This list is accurate as of 3/8/18  4:13 PM.  Always use your most recent med list.                   Brand Name Dispense Instructions for use Diagnosis    levothyroxine 88 MCG tablet    SYNTHROID/LEVOTHROID    90 tablet    Take 1 tablet (88 mcg) by mouth daily    Hypothyroidism, postablative
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
## 2018-03-08 NOTE — PATIENT INSTRUCTIONS
Stress test was normal  Slightly abnormal kidney function this can be due to dehydration, or suing too much Ibuprofen. Try Tylenol instead of Ibuprofen. Try to avoid NSAID's (naproxen, ibuprofen).  Thyroid recheck    Schedule with ortho for Cortisol shot

## 2018-03-12 DIAGNOSIS — R79.89 ABNORMAL BLOOD CREATININE LEVEL: ICD-10-CM

## 2018-03-12 DIAGNOSIS — E89.0 HYPOTHYROIDISM, POSTABLATIVE: Chronic | ICD-10-CM

## 2018-03-12 DIAGNOSIS — Z13.6 CARDIOVASCULAR SCREENING; LDL GOAL LESS THAN 160: ICD-10-CM

## 2018-03-12 LAB
CHOLEST SERPL-MCNC: 184 MG/DL
CREAT SERPL-MCNC: 1.11 MG/DL (ref 0.66–1.25)
GFR SERPL CREATININE-BSD FRML MDRD: 68 ML/MIN/1.7M2
HDLC SERPL-MCNC: 54 MG/DL
LDLC SERPL CALC-MCNC: 103 MG/DL
NONHDLC SERPL-MCNC: 130 MG/DL
T4 FREE SERPL-MCNC: 1.01 NG/DL (ref 0.76–1.46)
TRIGL SERPL-MCNC: 133 MG/DL
TSH SERPL DL<=0.005 MIU/L-ACNC: 4.69 MU/L (ref 0.4–4)

## 2018-03-12 PROCEDURE — 36415 COLL VENOUS BLD VENIPUNCTURE: CPT | Performed by: NURSE PRACTITIONER

## 2018-03-12 PROCEDURE — 80061 LIPID PANEL: CPT | Performed by: NURSE PRACTITIONER

## 2018-03-12 PROCEDURE — 82565 ASSAY OF CREATININE: CPT | Performed by: NURSE PRACTITIONER

## 2018-03-12 PROCEDURE — 84443 ASSAY THYROID STIM HORMONE: CPT | Performed by: NURSE PRACTITIONER

## 2018-03-12 PROCEDURE — 84439 ASSAY OF FREE THYROXINE: CPT | Performed by: NURSE PRACTITIONER

## 2018-04-10 ENCOUNTER — MYC MEDICAL ADVICE (OUTPATIENT)
Dept: ENDOCRINOLOGY | Facility: CLINIC | Age: 58
End: 2018-04-10

## 2018-04-10 DIAGNOSIS — E89.0 POSTABLATIVE HYPOTHYROIDISM: Primary | ICD-10-CM

## 2018-04-11 RX ORDER — LEVOTHYROXINE SODIUM 75 UG/1
75 TABLET ORAL DAILY
Qty: 90 TABLET | Refills: 1 | Status: SHIPPED | OUTPATIENT
Start: 2018-04-11 | End: 2018-09-24

## 2018-07-20 ENCOUNTER — OFFICE VISIT (OUTPATIENT)
Dept: FAMILY MEDICINE | Facility: CLINIC | Age: 58
End: 2018-07-20
Payer: COMMERCIAL

## 2018-07-20 VITALS
OXYGEN SATURATION: 100 % | HEART RATE: 68 BPM | WEIGHT: 182 LBS | DIASTOLIC BLOOD PRESSURE: 84 MMHG | TEMPERATURE: 97.3 F | BODY MASS INDEX: 27.58 KG/M2 | HEIGHT: 68 IN | SYSTOLIC BLOOD PRESSURE: 130 MMHG

## 2018-07-20 DIAGNOSIS — Z12.11 SPECIAL SCREENING FOR MALIGNANT NEOPLASMS, COLON: ICD-10-CM

## 2018-07-20 DIAGNOSIS — M72.2 PLANTAR FASCIITIS: Primary | ICD-10-CM

## 2018-07-20 PROCEDURE — 99213 OFFICE O/P EST LOW 20 MIN: CPT | Performed by: NURSE PRACTITIONER

## 2018-07-20 RX ORDER — DICLOFENAC SODIUM 75 MG/1
75 TABLET, DELAYED RELEASE ORAL 2 TIMES DAILY PRN
Qty: 60 TABLET | Refills: 1 | Status: SHIPPED | OUTPATIENT
Start: 2018-07-20 | End: 2021-10-26

## 2018-07-20 NOTE — MR AVS SNAPSHOT
After Visit Summary   7/20/2018    Temo Jalloh    MRN: [id number]           Patient Information     Date Of Birth          1960        Visit Information        Provider Department      7/20/2018 3:00 PM Cate Birmingham APRN [facility]        Today's Diagnoses     Special screening for malignant neoplasms, colon    -  1    Plantar fasciitis          Care Instructions    Athletic shoes, arch-supporting shoes (particularly those with an extra-long counter, which is the firm part of the shoe that surrounds the heel), or shoes with rigid shanks (usually a metal insert into the sole of the shoe) may be helpful.    Wearing slippers or going barefoot may aggravate symptoms or may result in a recurrence of symptoms  Prefabricated silicone heel inserts combined with stretching exercises can help.  Voltaren 75 mg twice daily as needed for pain  Exercises  If no improvement schedule with Dr. Pham, podiatrist                   Follow-ups after your visit        Additional Services     ORTHO  REFERRAL       [facility] is referring you to the Orthopedic  Services at [facility].       The  Representative will assist you in the coordination of your Orthopedic and Musculoskeletal Care as prescribed by your physician.    The  Representative will call you within 1 business day to help schedule your appointment, or you may contact the  Representative at:    All areas ~ [phone]     Type of Referral : [facility]       Timeframe requested: Within 2 weeks    Coverage of these services is subject to the terms and limitations of your health insurance plan.  Please call member services at your health plan with any benefit or coverage questions.      If X-rays, CT or MRI's have been performed, please contact the facility where they were done to arrange for , prior to  "your scheduled appointment.  Please bring this referral request to your appointment and present it to your specialist.                  Future tests that were ordered for you today     Open Future Orders        Priority Expected Expires Ordered    Fecal colorectal cancer screen (FIT) Routine 8/10/2018 12/12/2018 7/20/2018            Who to contact     If you have questions or need follow up information about today's clinic visit or your schedule please contact [facility] directly at [phone].  Normal or non-critical lab and imaging results will be communicated to you by [employer], letter or phone within 4 business days after the clinic has received the results. If you do not hear from us within 7 days, please contact the clinic through [employer] or phone. If you have a critical or abnormal lab result, we will notify you by phone as soon as possible.  Submit refill requests through [employer] or call your pharmacy and they will forward the refill request to us. Please allow 3 business days for your refill to be completed.          Additional Information About Your Visit        [employer] Information     [employer] gives you secure access to your electronic health record. If you see a primary care provider, you can also send messages to your care team and make appointments. If you have questions, please call your primary care clinic.  If you do not have a primary care provider, please call [phone] and they will assist you.        Care EveryWhere ID     This is your Care EveryWhere ID. This could be used by other organizations to access your [place] medical records  [id number]        Your Vitals Were     Pulse Temperature Height Pulse Oximetry BMI (Body Mass Index)       68 97.3  F (36.3  C) (Tympanic) 5' 8\" (1.727 m) 100% 27.67 kg/m2        Blood Pressure from Last 3 Encounters:   07/20/18 130/84   03/08/18 131/79   02/27/18 (!) 145/98    Weight from Last 3 Encounters:   07/20/18 182 lb (82.6 kg) "   03/08/18 182 lb 3.2 oz (82.6 kg)   02/27/18 181 lb 14.1 oz (82.5 kg)              We Performed the Following     ORTHO  REFERRAL          Today's Medication Changes          These changes are accurate as of 7/20/18  3:34 PM.  If you have any questions, ask your nurse or doctor.               Start taking these medicines.        Dose/Directions    diclofenac 75 MG EC tablet   Commonly known as:  VOLTAREN   Used for:  Plantar fasciitis        Dose:  75 mg   Take 1 tablet (75 mg) by mouth 2 times daily as needed for moderate pain   Quantity:  60 tablet   Refills:  1            Where to get your medicines      These medications were sent to [id number] - [place], [place] - [address]  [address], [facility] [address]     Phone:  [phone]     diclofenac 75 MG EC tablet                Primary Care Provider Office Phone # Fax #    Sherin House Eleonora, ANGEL [phone] [address] [address]        Equal Access to Services     YESICA STOKES AH: Hadii catalina ku hadasho Soomaali, waaxda luqadaha, qaybta kaalmada adeegyada, waxay idiin haysole del toro . So [facility] [phone].    ATENCIÓN: Si habla español, tiene a perez disposición servicios gratuitos de asistencia lingüística. Llame al [phone].    We comply with applicable federal civil rights laws and [place] laws. We do not discriminate on the basis of race, color, national origin, age, disability, sex, sexual orientation, or gender identity.            Thank you!     Thank you for choosing [facility]  for your care. Our goal is always to provide you with excellent care. Hearing back from our patients is one way we can continue to improve our services. Please take a few minutes to complete the written survey that you may receive in the mail after your visit with us. Thank you!             Your Updated Medication List - Protect others around you: Learn how to  safely use, store and throw away your medicines at www.disposemymeds.org.          This list is accurate as of 7/20/18  3:34 PM.  Always use your most recent med list.                   Brand Name Dispense Instructions for use Diagnosis    diclofenac 75 MG EC tablet    VOLTAREN    60 tablet    Take 1 tablet (75 mg) by mouth 2 times daily as needed for moderate pain    Plantar fasciitis       levothyroxine 75 MCG tablet    SYNTHROID/LEVOTHROID    90 tablet    Take 1 tablet (75 mcg) by mouth daily    Postablative hypothyroidism

## 2018-07-20 NOTE — PATIENT INSTRUCTIONS
Athletic shoes, arch-supporting shoes (particularly those with an extra-long counter, which is the firm part of the shoe that surrounds the heel), or shoes with rigid shanks (usually a metal insert into the sole of the shoe) may be helpful.    Wearing slippers or going barefoot may aggravate symptoms or may result in a recurrence of symptoms  Prefabricated silicone heel inserts combined with stretching exercises can help.  Voltaren 75 mg twice daily as needed for pain  Exercises  If no improvement schedule with Dr. Pham, podiatrist

## 2018-07-20 NOTE — PROGRESS NOTES
"  SUBJECTIVE:   Temo Jalloh is a 57 year old male who presents to clinic today for the following health issues:    Joint Pain    Onset: one month     Description:   Location: left foot, started on the inside of his foot and now his heel, mornings are the worse, he can barely step on it   Character: Sharp and Dull ache that comes and goes     Intensity: moderate    Progression of Symptoms: worse, the past 2 weeks     Accompanying Signs & Symptoms:  Other symptoms: none    History:   Previous similar pain: no       Precipitating factors:   Trauma or overuse: Is on his feet all day at work     Alleviating factors:  Improved by: nothing    Therapies Tried and outcome: ibuprofen, tylenol, ice- no improvement     Problem list and histories reviewed & adjusted, as indicated.  Additional history: as documented    Labs reviewed in EPIC    Reviewed and updated as needed this visit by clinical staff  Allergies       Reviewed and updated as needed this visit by Provider         ROS:  Constitutional, HEENT, cardiovascular, pulmonary, gi and gu systems are negative, except as otherwise noted.    OBJECTIVE:     /84  Pulse 68  Temp 97.3  F (36.3  C) (Tympanic)  Ht 5' 8\" (1.727 m)  Wt 182 lb (82.6 kg)  SpO2 100%  BMI 27.67 kg/m2  Body mass index is 27.67 kg/(m^2).  GENERAL: healthy, alert and no distress  MS: no gross musculoskeletal defects noted, no edema. The plantar surface and heel of the left foot is tender to palpation.   NEURO: Normal strength and tone, mentation intact and speech normal  PSYCH: mentation appears normal, affect normal/bright    Diagnostic Test Results:  none     ASSESSMENT/PLAN:     1. Plantar fasciitis  -avoid walking barefoot  -discussed gentle foot stretching exercises   -recommended to wear comfortable athletic shoes  - diclofenac (VOLTAREN) 75 MG EC tablet; Take 1 tablet (75 mg) by mouth 2 times daily as needed for moderate pain  Dispense: 60 tablet; Refill: 1  - ORTHO  " REFERRAL  -follow up with podiatrist if no improvement in 2 weeks     2. Special screening for malignant neoplasms, colon  - Fecal colorectal cancer screen (FIT); Future    See Patient Instructions    CHRIS Soto [facility]

## 2018-09-15 ENCOUNTER — HOSPITAL ENCOUNTER (EMERGENCY)
Facility: CLINIC | Age: 58
Discharge: HOME | End: 2018-09-15
Attending: FAMILY MEDICINE | Admitting: FAMILY MEDICINE
Payer: COMMERCIAL

## 2018-09-15 VITALS
OXYGEN SATURATION: 85 % | DIASTOLIC BLOOD PRESSURE: 103 MMHG | SYSTOLIC BLOOD PRESSURE: 133 MMHG | RESPIRATION RATE: 28 BRPM | TEMPERATURE: 98.2 F

## 2018-09-15 DIAGNOSIS — R00.2 PALPITATIONS: ICD-10-CM

## 2018-09-15 LAB
ALBUMIN SERPL-MCNC: 4.1 G/DL (ref 3.4–5)
ALP SERPL-CCNC: 63 U/L (ref 40–150)
ALT SERPL W P-5'-P-CCNC: 62 U/L (ref 0–70)
ANION GAP SERPL CALCULATED.3IONS-SCNC: 3 MMOL/L (ref 3–14)
AST SERPL W P-5'-P-CCNC: 27 U/L (ref 0–45)
BASOPHILS # BLD AUTO: 0 10E9/L (ref 0–0.2)
BASOPHILS NFR BLD AUTO: 0.3 %
BILIRUB SERPL-MCNC: 1.6 MG/DL (ref 0.2–1.3)
BUN SERPL-MCNC: 16 MG/DL (ref 7–30)
CALCIUM SERPL-MCNC: 8.2 MG/DL (ref 8.5–10.1)
CHLORIDE SERPL-SCNC: 105 MMOL/L (ref 94–109)
CO2 SERPL-SCNC: 30 MMOL/L (ref 20–32)
CREAT SERPL-MCNC: 1.17 MG/DL (ref 0.66–1.25)
DIFFERENTIAL METHOD BLD: NORMAL
EOSINOPHIL # BLD AUTO: 0.1 10E9/L (ref 0–0.7)
EOSINOPHIL NFR BLD AUTO: 1.9 %
ERYTHROCYTE [DISTWIDTH] IN BLOOD BY AUTOMATED COUNT: 12.5 % (ref 10–15)
GFR SERPL CREATININE-BSD FRML MDRD: 64 ML/MIN/1.7M2
GLUCOSE SERPL-MCNC: 120 MG/DL (ref 70–99)
HCT VFR BLD AUTO: 48.7 % (ref 40–53)
HGB BLD-MCNC: 16.7 G/DL (ref 13.3–17.7)
IMM GRANULOCYTES # BLD: 0 10E9/L (ref 0–0.4)
IMM GRANULOCYTES NFR BLD: 0.2 %
LYMPHOCYTES # BLD AUTO: 1.6 10E9/L (ref 0.8–5.3)
LYMPHOCYTES NFR BLD AUTO: 25.9 %
MCH RBC QN AUTO: 30.6 PG (ref 26.5–33)
MCHC RBC AUTO-ENTMCNC: 34.3 G/DL (ref 31.5–36.5)
MCV RBC AUTO: 89 FL (ref 78–100)
MONOCYTES # BLD AUTO: 0.4 10E9/L (ref 0–1.3)
MONOCYTES NFR BLD AUTO: 6.5 %
NEUTROPHILS # BLD AUTO: 4.1 10E9/L (ref 1.6–8.3)
NEUTROPHILS NFR BLD AUTO: 65.2 %
NRBC # BLD AUTO: 0 10*3/UL
NRBC BLD AUTO-RTO: 0 /100
PLATELET # BLD AUTO: 199 10E9/L (ref 150–450)
POTASSIUM SERPL-SCNC: 3.6 MMOL/L (ref 3.4–5.3)
PROT SERPL-MCNC: 7.4 G/DL (ref 6.8–8.8)
RBC # BLD AUTO: 5.46 10E12/L (ref 4.4–5.9)
SODIUM SERPL-SCNC: 138 MMOL/L (ref 133–144)
TROPONIN I SERPL-MCNC: <0.015 UG/L (ref 0–0.04)
WBC # BLD AUTO: 6.3 10E9/L (ref 4–11)

## 2018-09-15 PROCEDURE — 84484 ASSAY OF TROPONIN QUANT: CPT | Performed by: FAMILY MEDICINE

## 2018-09-15 PROCEDURE — 80053 COMPREHEN METABOLIC PANEL: CPT | Performed by: FAMILY MEDICINE

## 2018-09-15 PROCEDURE — 99284 EMERGENCY DEPT VISIT MOD MDM: CPT | Mod: 25 | Performed by: FAMILY MEDICINE

## 2018-09-15 PROCEDURE — 93010 ELECTROCARDIOGRAM REPORT: CPT | Mod: Z6 | Performed by: FAMILY MEDICINE

## 2018-09-15 PROCEDURE — 93005 ELECTROCARDIOGRAM TRACING: CPT | Performed by: FAMILY MEDICINE

## 2018-09-15 PROCEDURE — 85025 COMPLETE CBC W/AUTO DIFF WBC: CPT | Performed by: FAMILY MEDICINE

## 2018-09-15 PROCEDURE — 99284 EMERGENCY DEPT VISIT MOD MDM: CPT | Performed by: FAMILY MEDICINE

## 2018-09-15 ASSESSMENT — ENCOUNTER SYMPTOMS
PALPITATIONS: 1
CHILLS: 0
NECK PAIN: 0
FEVER: 0
FATIGUE: 0
DIAPHORESIS: 0
COUGH: 0
CHEST TIGHTNESS: 0
NAUSEA: 0
SHORTNESS OF BREATH: 1
CHOKING: 0

## 2018-09-15 NOTE — ED AVS SNAPSHOT
[facility]    [address] [address]    Phone:  [phone]    Fax:  [phone]                                       Temo Jalloh   MRN: [id number]    Department:  [facility]   Date of Visit:  9/15/2018           After Visit Summary Signature Page     I have received my discharge instructions, and my questions have been answered. I have discussed any challenges I see with this plan with the nurse or doctor.    ..........................................................................................................................................  Patient/Patient Representative Signature      ..........................................................................................................................................  Patient Representative Print Name and Relationship to Patient    ..................................................               ................................................  Date                                   Time    ..........................................................................................................................................  Reviewed by Signature/Title    ...................................................              ..............................................  Date                                               Time          22EPIC Rev 08/18

## 2018-09-15 NOTE — ED NOTES
"Pt presents to ED today with c/o irregular heartbeat.  States he feels his heart go faster and slower intermittently.  Describes CP as pressure/ache.  Noticed symptoms \"a couple of nights ago.\"  Pt states that when it occurs it will make him SOA feeling.  No recent illness or injury.  Pt takes baby ASA daily.  Does not have hx of irregular HR.  Has had stress test this year, was seen in ED in February.  Pt states that test was normal.  Denies any recent illness or injury.   Pain is reproducible at times, does not radiate.  Pt A&Ox4.  Vss.     "

## 2018-09-15 NOTE — ED PROVIDER NOTES
History     Chief Complaint   Patient presents with     Chest Pain     heart feels like its racing     HPI  Temo Jalloh is a 57 year old male who presents with an episode of an abnormal heart rhythm.  He states that for the last week or so he has had intermittent spells where he will get a very rapid heart rate and occasionally this is accompanied by some throat tightness and shortness of breath and it always resolves spontaneously.  He admits that he had a stress test back this spring and that was completely normal and that result was reviewed today.  He also notes that these spells tend to be triggered when he has a cocktail or 2 before bedtime.  His last spell was this morning around 10:00 these spells last less than an hour.  He is a non-smoker.  He does use some caffeine.  He also has a significant past medical history for hypothyroidism with Graves' disease and had a recent adjustment in his dosage of Synthroid.    At this time patient is asymptomatic.    Problem List:    Patient Active Problem List    Diagnosis Date Noted     Hypothyroidism, postablative 12/07/2013     Priority: Medium     Problem list name updated by automated process. Provider to review       Graves' eye disease 06/24/2013     Priority: Medium     extensive fam hx, eye disease and confirmed thyrotoxicosis         Hyperthyroidism 05/23/2013     Priority: Medium     Sensorineural hearing loss, asymmetrical 06/06/2011     Priority: Medium     CARDIOVASCULAR SCREENING; LDL GOAL LESS THAN 160 06/06/2011     Priority: Medium     Allergic rhinitis 05/02/2005     Priority: Medium     Problem list name updated by automated process. Provider to review       LEFT AC ARTHROSIS 05/02/2005     Priority: Medium     Left          Past Medical History:    Past Medical History:   Diagnosis Date     Allergic rhinitis, cause unspecified      Hyperthyroidism 5/23/2013     Injury, other and unspecified, shoulder and upper arm      Other postablative  hypothyroidism 12/7/2013     Plantar fascial fibromatosis      Venereal disease, unspecified        Past Surgical History:    No past surgical history on file.    Family History:    Family History   Problem Relation Age of Onset     C.A.D. Father      Hypertension Father      Diabetes Father      Thyroid Disease Mother      Thyroid Disease Sister      Thyroid Disease Maternal Uncle      Thyroid Disease Other      two nieces     Breast Cancer No family hx of      Cancer - colorectal No family hx of        Social History:  Marital Status:   [2]  Social History   Substance Use Topics     Smoking status: Never Smoker     Smokeless tobacco: Never Used     Alcohol use Yes      Comment: 2 times weekly        Medications:      diclofenac (VOLTAREN) 75 MG EC tablet   levothyroxine (SYNTHROID/LEVOTHROID) 75 MCG tablet   [DISCONTINUED] NO ACTIVE MEDICATIONS         Review of Systems   Constitutional: Negative for chills, diaphoresis, fatigue and fever.   Respiratory: Positive for shortness of breath. Negative for cough, choking and chest tightness.    Cardiovascular: Positive for palpitations. Negative for chest pain and leg swelling.   Gastrointestinal: Negative for nausea.   Musculoskeletal: Negative for neck pain.   Skin: Negative for rash.       Physical Exam   BP: 148/82  Heart Rate: 83  Temp: 98.2  F (36.8  C)  SpO2: 100 %      Physical Exam   Constitutional: He appears well-developed and well-nourished. No distress.   HENT:   Head: Normocephalic and atraumatic.   Eyes: Pupils are equal, round, and reactive to light.   Right exophthalmos   Neck: Normal range of motion. Neck supple.   Cardiovascular: Normal rate, regular rhythm and normal heart sounds.    No murmur heard.  Pulmonary/Chest: Effort normal and breath sounds normal. No respiratory distress. He has no wheezes.   Musculoskeletal: Normal range of motion.   Neurological: He is alert.   Skin: Skin is warm and dry. He is not diaphoretic.   Psychiatric: He  has a normal mood and affect.       ED Course     ED Course     Procedures               EKG Interpretation:      Interpreted by Vladislav Herramnn  Time reviewed: 1135  Symptoms at time of EKG: none   Rhythm: normal sinus   Rate: normal  Axis: normal  Ectopy: none  Conduction: normal  ST Segments/ T Waves: No ST-T wave changes  Q Waves: none  Comparison to prior: Unchanged from 2/27/18    Clinical Impression: normal EKG          Critical Care time:  none               Results for orders placed or performed during the hospital encounter of 09/15/18 (from the past 24 hour(s))   Comprehensive metabolic panel   Result Value Ref Range    Sodium 138 133 - 144 mmol/L    Potassium 3.6 3.4 - 5.3 mmol/L    Chloride 105 94 - 109 mmol/L    Carbon Dioxide 30 20 - 32 mmol/L    Anion Gap 3 3 - 14 mmol/L    Glucose 120 (H) 70 - 99 mg/dL    Urea Nitrogen 16 7 - 30 mg/dL    Creatinine 1.17 0.66 - 1.25 mg/dL    GFR Estimate 64 >60 mL/min/1.7m2    GFR Estimate If Black 77 >60 mL/min/1.7m2    Calcium 8.2 (L) 8.5 - 10.1 mg/dL    Bilirubin Total 1.6 (H) 0.2 - 1.3 mg/dL    Albumin 4.1 3.4 - 5.0 g/dL    Protein Total 7.4 6.8 - 8.8 g/dL    Alkaline Phosphatase 63 40 - 150 U/L    ALT 62 0 - 70 U/L    AST 27 0 - 45 U/L   CBC with platelets differential   Result Value Ref Range    WBC 6.3 4.0 - 11.0 10e9/L    RBC Count 5.46 4.4 - 5.9 10e12/L    Hemoglobin 16.7 13.3 - 17.7 g/dL    Hematocrit 48.7 40.0 - 53.0 %    MCV 89 78 - 100 fl    MCH 30.6 26.5 - 33.0 pg    MCHC 34.3 31.5 - 36.5 g/dL    RDW 12.5 10.0 - 15.0 %    Platelet Count 199 150 - 450 10e9/L    Diff Method Automated Method     % Neutrophils 65.2 %    % Lymphocytes 25.9 %    % Monocytes 6.5 %    % Eosinophils 1.9 %    % Basophils 0.3 %    % Immature Granulocytes 0.2 %    Nucleated RBCs 0 0 /100    Absolute Neutrophil 4.1 1.6 - 8.3 10e9/L    Absolute Lymphocytes 1.6 0.8 - 5.3 10e9/L    Absolute Monocytes 0.4 0.0 - 1.3 10e9/L    Absolute Eosinophils 0.1 0.0 - 0.7 10e9/L    Absolute  Basophils 0.0 0.0 - 0.2 10e9/L    Abs Immature Granulocytes 0.0 0 - 0.4 10e9/L    Absolute Nucleated RBC 0.0    Troponin I   Result Value Ref Range    Troponin I ES <0.015 0.000 - 0.045 ug/L       Medications - No data to display    Assessments & Plan (with Medical Decision Making)     I have reviewed the nursing notes.    I have reviewed the findings, diagnosis, plan and need for follow up with the patient.  Labs were all normal.  EKG was normal.  Results reviewed with the patient.  His symptoms sound like either episodes of intermittent atrial fibrillation or PSVT.  I discussed both these possibilities with the patient.  He already has a follow-up appointment scheduled for Monday.  We talked about getting a Holter or a event recorder and/or getting cardiology consultation after he meets with his primary care.  At this time he is safe to discharge home and is given informational handouts about these problems.  Also indications for return are discussed.    New Prescriptions    No medications on file       Final diagnoses:   Palpitations       9/15/2018   [facility] EMERGENCY DEPARTMENT     Vladislav Herrmann MD  09/15/18 5038

## 2018-09-15 NOTE — ED AVS SNAPSHOT
[facility] Emergency Department    [address] [address]    Phone:  [phone]    Fax:  [phone]                                       Temo Jalloh   MRN: [id number]    Department:  [facility] Emergency Department   Date of Visit:  9/15/2018           Patient Information     Date Of Birth          1960        Your diagnoses for this visit were:     Palpitations        You were seen by Vladislav Herrmann MD.      Follow-up Information     Follow up with Sherin Crews NP In 2 days.    Specialty:  Nurse Practitioner - Family    Why:  as scheduled    Contact information:    [address] [address]  [phone]          Discharge Instructions         Understanding Heart Palpitations    Heart palpitations are a symptom. It s the feeling you have when your heartbeat seems to be racing, pounding, skipping, or fluttering. Heart palpitations are most often felt in the chest. Sometimes, they may also be felt in the neck.  What causes heart palpitations?  In most cases, heart palpitations are caused by:    Stress or anxiety    Exercise    Pregnancy    Some medicines    Caffeine    Nicotine    Alcohol    Illegal drugs, such as cocaine    Health problems, such as anemia or overactive thyroid  In some cases, heart palpitations may be caused by a problem with the heart. Abnormal heart rhythms (arrhythmias) are the main concern. They may need to be managed by you and your healthcare provider or treated right away.  How are heart palpitations treated?  Treatments for heart palpitations depend on the cause. Options may include:    Managing the things that trigger your heart palpitations. This could mean:  ? Learning ways to reduce stress and anxiety  ? Avoiding caffeine, nicotine, alcohol, or illegal drugs  ? Stopping the use of certain medicines, under your doctor s guidance    Medicines, procedures, or surgery to treat an arrhythmia or other health problem  that is causing your symptoms  What are the complications of heart palpitations?  Complications of heart palpitations are rare unless they are caused by a problem such as an arrhythmia. In such cases, complications can include:    Fainting    Heart failure. This problem occurs when the heart is so weak it no longer pumps blood well.    Blood clots and stroke    Sudden cardiac arrest. This problem occurs when the heart suddenly stops beating.  When should I call my healthcare provider?  Call your healthcare provider right away if you have any of these:    Fever of 100.4 F (38 C) or higher, or as directed    Symptoms that don t get better with treatment, or symptoms that get worse    New symptoms, such as chest pain, shortness of breath, dizziness, or fainting   Date Last Reviewed: 5/1/2016 2000-2017 [employer]. [address] [place] [address]. All rights reserved. This information is not intended as a substitute for professional medical care. Always follow your healthcare professional's instructions.          Discharge References/Attachments     TACHYCARDIA: PAT  (ENGLISH)    ATRIAL FIBRILLATION, UNDERSTANDING (ENGLISH)      Your next 10 appointments already scheduled     Sep 17, 2018  4:00 PM CDT   SHORT with Sheba Post MD   [facility]    [address] [address] [phone]              24 Hour Appointment Hotline       To make an appointment at any [facility], call [phone] ([phone]). If you don't have a family doctor or clinic, we will help you find one. [facility] are conveniently located to serve the needs of you and your family.             Review of your medicines      Our records show that you are taking the medicines listed below. If these are incorrect, please call your family doctor or clinic.        Dose / Directions Last dose taken    diclofenac 75 MG EC tablet   Commonly  known as:  VOLTAREN   Dose:  75 mg   Quantity:  60 tablet        Take 1 tablet (75 mg) by mouth 2 times daily as needed for moderate pain   Refills:  1        levothyroxine 75 MCG tablet   Commonly known as:  SYNTHROID/LEVOTHROID   Dose:  75 mcg   Quantity:  90 tablet        Take 1 tablet (75 mcg) by mouth daily   Refills:  1                Procedures and tests performed during your visit     CBC with platelets differential    Comprehensive metabolic panel    EKG 12-lead, tracing only    Troponin I      Orders Needing Specimen Collection     None      Pending Results     No orders found from 9/13/2018 to 9/16/2018.            Pending Culture Results     No orders found from 9/13/2018 to 9/16/2018.            Pending Results Instructions     If you had any lab results that were not finalized at the time of your Discharge, you can call the ED Lab Result RN at [phone]. You will be contacted by this team for any positive Lab results or changes in treatment. The nurses are available 7 days a week from 10A to 6:30P.  You can leave a message 24 hours per day and they will return your call.        Test Results From Your Hospital Stay        9/15/2018 12:01 PM      Component Results     Component Value Ref Range & Units Status    Sodium 138 133 - 144 mmol/L Final    Potassium 3.6 3.4 - 5.3 mmol/L Final    Chloride 105 94 - 109 mmol/L Final    Carbon Dioxide 30 20 - 32 mmol/L Final    Anion Gap 3 3 - 14 mmol/L Final    Glucose 120 (H) 70 - 99 mg/dL Final    Urea Nitrogen 16 7 - 30 mg/dL Final    Creatinine 1.17 0.66 - 1.25 mg/dL Final    GFR Estimate 64 >60 mL/min/1.7m2 Final    Non  GFR Calc    GFR Estimate If Black 77 >60 mL/min/1.7m2 Final    African American GFR Calc    Calcium 8.2 (L) 8.5 - 10.1 mg/dL Final    Bilirubin Total 1.6 (H) 0.2 - 1.3 mg/dL Final    Albumin 4.1 3.4 - 5.0 g/dL Final    Protein Total 7.4 6.8 - 8.8 g/dL Final    Alkaline Phosphatase 63 40 - 150 U/L Final    ALT 62 0 - 70 U/L  Final    AST 27 0 - 45 U/L Final         9/15/2018 11:42 AM      Component Results     Component Value Ref Range & Units Status    WBC 6.3 4.0 - 11.0 10e9/L Final    RBC Count 5.46 4.4 - 5.9 10e12/L Final    Hemoglobin 16.7 13.3 - 17.7 g/dL Final    Hematocrit 48.7 40.0 - 53.0 % Final    MCV 89 78 - 100 fl Final    MCH 30.6 26.5 - 33.0 pg Final    MCHC 34.3 31.5 - 36.5 g/dL Final    RDW 12.5 10.0 - 15.0 % Final    Platelet Count 199 150 - 450 10e9/L Final    Diff Method Automated Method  Final    % Neutrophils 65.2 % Final    % Lymphocytes 25.9 % Final    % Monocytes 6.5 % Final    % Eosinophils 1.9 % Final    % Basophils 0.3 % Final    % Immature Granulocytes 0.2 % Final    Nucleated RBCs 0 0 /100 Final    Absolute Neutrophil 4.1 1.6 - 8.3 10e9/L Final    Absolute Lymphocytes 1.6 0.8 - 5.3 10e9/L Final    Absolute Monocytes 0.4 0.0 - 1.3 10e9/L Final    Absolute Eosinophils 0.1 0.0 - 0.7 10e9/L Final    Absolute Basophils 0.0 0.0 - 0.2 10e9/L Final    Abs Immature Granulocytes 0.0 0 - 0.4 10e9/L Final    Absolute Nucleated RBC 0.0  Final         9/15/2018 12:01 PM      Component Results     Component Value Ref Range & Units Status    Troponin I ES <0.015 0.000 - 0.045 ug/L Final    The 99th percentile for upper reference range is 0.045 ug/L.  Troponin values   in the range of 0.045 - 0.120 ug/L may be associated with risks of adverse   clinical events.                  Thank you for choosing [facility]       Thank you for choosing [facility] for your care. Our goal is always to provide you with excellent care. Hearing back from our patients is one way we can continue to improve our services. Please take a few minutes to complete the written survey that you may receive in the mail after you visit with us. Thank you!        [employer] Information     [employer] gives you secure access to your electronic health record. If you see a primary care provider, you can also send messages to your care team and make appointments. If you  have questions, please call your primary care clinic.  If you do not have a primary care provider, please call [phone] and they will assist you.        Care EveryWhere ID     This is your Care EveryWhere ID. This could be used by other organizations to access your [facility] medical records  [id number]        Equal Access to Services     YESICA STOKES : Sammie Koch, funmi flor, fozia ghosh. So [facility] [phone].    ATENCIÓN: Si habla español, tiene a perez disposición servicios gratuitos de asistencia lingüística. Llame al [phone].    We comply with applicable federal civil rights laws and [place] laws. We do not discriminate on the basis of race, color, national origin, age, disability, sex, sexual orientation, or gender identity.            After Visit Summary       This is your record. Keep this with you and show to your community pharmacist(s) and doctor(s) at your next visit.

## 2018-09-15 NOTE — DISCHARGE INSTRUCTIONS
Understanding Heart Palpitations    Heart palpitations are a symptom. It s the feeling you have when your heartbeat seems to be racing, pounding, skipping, or fluttering. Heart palpitations are most often felt in the chest. Sometimes, they may also be felt in the neck.  What causes heart palpitations?  In most cases, heart palpitations are caused by:    Stress or anxiety    Exercise    Pregnancy    Some medicines    Caffeine    Nicotine    Alcohol    Illegal drugs, such as cocaine    Health problems, such as anemia or overactive thyroid  In some cases, heart palpitations may be caused by a problem with the heart. Abnormal heart rhythms (arrhythmias) are the main concern. They may need to be managed by you and your healthcare provider or treated right away.  How are heart palpitations treated?  Treatments for heart palpitations depend on the cause. Options may include:    Managing the things that trigger your heart palpitations. This could mean:  ? Learning ways to reduce stress and anxiety  ? Avoiding caffeine, nicotine, alcohol, or illegal drugs  ? Stopping the use of certain medicines, under your doctor s guidance    Medicines, procedures, or surgery to treat an arrhythmia or other health problem that is causing your symptoms  What are the complications of heart palpitations?  Complications of heart palpitations are rare unless they are caused by a problem such as an arrhythmia. In such cases, complications can include:    Fainting    Heart failure. This problem occurs when the heart is so weak it no longer pumps blood well.    Blood clots and stroke    Sudden cardiac arrest. This problem occurs when the heart suddenly stops beating.  When should I call my healthcare provider?  Call your healthcare provider right away if you have any of these:    Fever of 100.4 F (38 C) or higher, or as directed    Symptoms that don t get better with treatment, or symptoms that get worse    New symptoms, such as chest pain,  shortness of breath, dizziness, or fainting   Date Last Reviewed: 5/1/2016 2000-2017 [employer]. [address] [place] [address], [place] [address]. All rights reserved. This information is not intended as a substitute for professional medical care. Always follow your healthcare professional's instructions.

## 2018-09-17 ENCOUNTER — OFFICE VISIT (OUTPATIENT)
Dept: FAMILY MEDICINE | Facility: CLINIC | Age: 58
End: 2018-09-17
Payer: COMMERCIAL

## 2018-09-17 VITALS
HEIGHT: 68 IN | DIASTOLIC BLOOD PRESSURE: 82 MMHG | OXYGEN SATURATION: 98 % | WEIGHT: 181.5 LBS | HEART RATE: 61 BPM | SYSTOLIC BLOOD PRESSURE: 132 MMHG | BODY MASS INDEX: 27.51 KG/M2

## 2018-09-17 DIAGNOSIS — E05.90 HYPERTHYROIDISM: ICD-10-CM

## 2018-09-17 DIAGNOSIS — E05.00 GRAVES' EYE DISEASE: ICD-10-CM

## 2018-09-17 DIAGNOSIS — R00.0 TACHYCARDIA: Primary | ICD-10-CM

## 2018-09-17 DIAGNOSIS — E89.0 HYPOTHYROIDISM, POSTABLATIVE: Chronic | ICD-10-CM

## 2018-09-17 PROCEDURE — 99214 OFFICE O/P EST MOD 30 MIN: CPT | Performed by: FAMILY MEDICINE

## 2018-09-17 NOTE — MR AVS SNAPSHOT
After Visit Summary   9/17/2018    Temo Jalloh    MRN: [id number]           Patient Information     Date Of Birth          1960        Visit Information        Provider Department      9/17/2018 4:00 PM Sheba Post MD [facility]        Today's Diagnoses     Tachycardia    -  1    Hyperthyroidism        Graves' eye disease          Care Instructions    *    Sounds like palpitations.     *    Next step would be to see a heart doctor, a cardiologist. Call [phone].     *    I think a long term heart monitor is needed.     *    For sorting out a heart attack, look for new symptoms.     *    Okay to push yourself.           Follow-ups after your visit        Additional Services     CARDIOLOGY EVAL ADULT REFERRAL       Preferred location:  [facility] [phone]   [email or web address]    Please be aware that coverage of these services is subject to the terms and limitations of your health insurance plan.  Call member services at your health plan with any benefit or coverage questions.      Please bring the following to your appointment:  Any x-rays, CTs or MRIs which have been performed. Contact the facility where they were done to arrange for  prior to your scheduled appointment.    List of current medications  This referral request   Any documents/labs given to you for this referral                  Who to contact     Normal or non-critical lab and imaging results will be communicated to you by MyChart, letter or phone within 4 business days after the clinic has received the results. If you do not hear from us within 7 days, please contact the clinic through MyChart or phone. If you have a critical or abnormal lab result, we will notify you by phone as soon as possible.  Submit refill requests through [employer] or call your pharmacy and they will forward the refill request to us. Please  "allow 3 business days for your refill to be completed.          If you need to speak with a  for additional information , please call: [phone]           Additional Information About Your Visit        MyChart Information     [employer] gives you secure access to your electronic health record. If you see a primary care provider, you can also send messages to your care team and make appointments. If you have questions, please call your primary care clinic.  If you do not have a primary care provider, please call [phone] and they will assist you.        Care EveryWhere ID     This is your Care EveryWhere ID. This could be used by other organizations to access your [facility] medical records  [id number]        Your Vitals Were     Pulse Height Pulse Oximetry BMI (Body Mass Index)          61 5' 8\" (1.727 m) 98% 27.6 kg/m2         Blood Pressure from Last 3 Encounters:   09/17/18 132/82   09/15/18 (!) 133/103   07/20/18 130/84    Weight from Last 3 Encounters:   09/17/18 181 lb 8 oz (82.3 kg)   07/20/18 182 lb (82.6 kg)   03/08/18 182 lb 3.2 oz (82.6 kg)              We Performed the Following     CARDIOLOGY EVAL ADULT REFERRAL        Primary Care Provider Office Phone # Fax #    Sherin Lacy Crews [phone] [address] [address]        Equal Access to Services     YESICA [facility] HospitalKRISTEN : Hadii aad ku hadasho Soomaali, waaxda luqadaha, qaybta kaalmada adeegyada, fozia dominguez haysole del toro . So [facility] [place] [facility] [phone].    ATENCIÓN: Si habla español, tiene a perez disposición servicios gratuitos de asistencia lingüística. Llame al [phone].    We comply with applicable federal civil rights laws and [place] laws. We do not discriminate on the basis of race, color, national origin, age, disability, sex, sexual orientation, or gender identity.            Thank you!     Thank you for choosing [facility]  for your care. Our goal is always to " provide you with excellent care. Hearing back from our patients is one way we can continue to improve our services. Please take a few minutes to complete the written survey that you may receive in the mail after your visit with us. Thank you!             Your Updated Medication List - Protect others around you: Learn how to safely use, store and throw away your medicines at www.disposemymeds.org.          This list is accurate as of 9/17/18  4:16 PM.  Always use your most recent med list.                   Brand Name Dispense Instructions for use Diagnosis    diclofenac 75 MG EC tablet    VOLTAREN    60 tablet    Take 1 tablet (75 mg) by mouth 2 times daily as needed for moderate pain    Plantar fasciitis       levothyroxine 75 MCG tablet    SYNTHROID/LEVOTHROID    90 tablet    Take 1 tablet (75 mcg) by mouth daily    Postablative hypothyroidism

## 2018-09-17 NOTE — PATIENT INSTRUCTIONS
*    Sounds like palpitations.     *    Next step would be to see a heart doctor, a cardiologist. Call [phone].     *    I think a long term heart monitor is needed.     *    For sorting out a heart attack, look for new symptoms.     *    Okay to push yourself.

## 2018-09-17 NOTE — PROGRESS NOTES
SUBJECTIVE:   Temo Jalloh is a 57 year old male who presents to clinic today for the following health issues:      CHEST PAIN     Onset: Intermittently x 3 months    Description:   Location:  left side  Character: achey and poking feeling  Radiation: None  Duration: intermittently, episodes last only seconds    Intensity: moderate    Progression of Symptoms:  worsening    Accompanying Signs & Symptoms:  Shortness of breath: YES  Sweating: no  Nausea/vomiting: no  Lightheadedness: YES- mild  Palpitations: YES and racing feeling  Fever/Chills: no  Cough: no  Heartburn: no    History:   Family history of heart disease YES- Both mother and father  Tobacco use: no    Precipitating factors:   Worse with exertion: no  Worse with deep breaths :  no  Related to food: no    Alleviating factors:  TUMS       Therapies Tried and outcome: He has taken TUMS with improvement        Problem list and histories reviewed & adjusted, as indicated.  Additional history: as documented    Patient Active Problem List   Diagnosis     Allergic rhinitis     LEFT AC ARTHROSIS     Sensorineural hearing loss, asymmetrical     CARDIOVASCULAR SCREENING; LDL GOAL LESS THAN 160     Hyperthyroidism     Graves' eye disease     Hypothyroidism, postablative     History reviewed. No pertinent surgical history.    Social History   Substance Use Topics     Smoking status: Never Smoker     Smokeless tobacco: Never Used     Alcohol use Yes      Comment: 2 times weekly     Family History   Problem Relation Age of Onset     C.A.D. Father      Hypertension Father      Diabetes Father      Thyroid Disease Mother      Thyroid Disease Sister      Thyroid Disease Maternal Uncle      Thyroid Disease Other      two nieces     Breast Cancer No family hx of      Cancer - colorectal No family hx of            Reviewed and updated as needed this visit by clinical staff       Reviewed and updated as needed this visit by Provider         ROS:  Constitutional, HEENT,  "cardiovascular, pulmonary, gi  systems are negative, except as otherwise noted.    OBJECTIVE:     /82  Pulse 61  Ht 5' 8\" (1.727 m)  Wt 181 lb 8 oz (82.3 kg)  SpO2 98%  BMI 27.6 kg/m2  Body mass index is 27.6 kg/(m^2).  GENERAL: Healthy, alert and no distress  EYES: Eyes grossly normal to inspection, conjunctivae and sclerae normal  RESP: Lungs clear to auscultation - no rales, rhonchi or wheezes  CV: Regular rate and rhythm, normal S1 S2, no murmur  MS: No gross musculoskeletal defects noted, no edema  NEURO: Normal strength and tone, mentation intact and speech normal  PSYCH: Mentation appears normal, affect normal/bright     Results for orders placed or performed during the hospital encounter of 09/15/18   Comprehensive metabolic panel   Result Value Ref Range    Sodium 138 133 - 144 mmol/L    Potassium 3.6 3.4 - 5.3 mmol/L    Chloride 105 94 - 109 mmol/L    Carbon Dioxide 30 20 - 32 mmol/L    Anion Gap 3 3 - 14 mmol/L    Glucose 120 (H) 70 - 99 mg/dL    Urea Nitrogen 16 7 - 30 mg/dL    Creatinine 1.17 0.66 - 1.25 mg/dL    GFR Estimate 64 >60 mL/min/1.7m2    GFR Estimate If Black 77 >60 mL/min/1.7m2    Calcium 8.2 (L) 8.5 - 10.1 mg/dL    Bilirubin Total 1.6 (H) 0.2 - 1.3 mg/dL    Albumin 4.1 3.4 - 5.0 g/dL    Protein Total 7.4 6.8 - 8.8 g/dL    Alkaline Phosphatase 63 40 - 150 U/L    ALT 62 0 - 70 U/L    AST 27 0 - 45 U/L   CBC with platelets differential   Result Value Ref Range    WBC 6.3 4.0 - 11.0 10e9/L    RBC Count 5.46 4.4 - 5.9 10e12/L    Hemoglobin 16.7 13.3 - 17.7 g/dL    Hematocrit 48.7 40.0 - 53.0 %    MCV 89 78 - 100 fl    MCH 30.6 26.5 - 33.0 pg    MCHC 34.3 31.5 - 36.5 g/dL    RDW 12.5 10.0 - 15.0 %    Platelet Count 199 150 - 450 10e9/L    Diff Method Automated Method     % Neutrophils 65.2 %    % Lymphocytes 25.9 %    % Monocytes 6.5 %    % Eosinophils 1.9 %    % Basophils 0.3 %    % Immature Granulocytes 0.2 %    Nucleated RBCs 0 0 /100    Absolute Neutrophil 4.1 1.6 - 8.3 10e9/L    " Absolute Lymphocytes 1.6 0.8 - 5.3 10e9/L    Absolute Monocytes 0.4 0.0 - 1.3 10e9/L    Absolute Eosinophils 0.1 0.0 - 0.7 10e9/L    Absolute Basophils 0.0 0.0 - 0.2 10e9/L    Abs Immature Granulocytes 0.0 0 - 0.4 10e9/L    Absolute Nucleated RBC 0.0    Troponin I   Result Value Ref Range    Troponin I ES <0.015 0.000 - 0.045 ug/L        ASSESSMENT/PLAN:     (R00.0) Tachycardia  (primary encounter diagnosis)  Comment: I did review the patient's recent cardiac history.  Late summer, an episode consistent with costochondritis or rib subluxation.  Nuclear stress test was negative for ischemia, LV function intact.  Recent episodes appear dysrhythmic in nature.  He describes at least 3 episodes of rapid heartbeats that are suggestive of SVT.  He notes intermittent palpitations  that seemed to improve with decreasing caffeine and abstaining from alcohol.  Medically, he appears to be stable.  Will refer for EP cardiology consultation.  Plan: CARDIOLOGY EVAL ADULT REFERRAL            (E05.90) Hyperthyroidism  Comment: Treated with thyroid ablation.  Now on oral supplementation.      (E05.00) Graves' eye disease  Comment: Still persistent symptoms in his right eye.  Does not affect vision.      (E89.0) Hypothyroidism, postablative  Comment:   TSH   Date Value Ref Range Status   03/12/2018 4.69 (H) 0.40 - 4.00 mU/L Final      Plan: I would not want to raise the dose of his thyroid supplement for the risk of triggering SVT.  Advised to continue on current dose of thyroid medication.        Patient Instructions   *    Sounds like palpitations.     *    Next step would be to see a heart doctor, a cardiologist. Call [phone].     *    I think a long term heart monitor is needed.     *    For sorting out a heart attack, look for new symptoms.     *    Okay to push yourself.                Sheba Post MD  [facility]

## 2018-09-24 DIAGNOSIS — E89.0 POSTABLATIVE HYPOTHYROIDISM: ICD-10-CM

## 2018-09-24 NOTE — TELEPHONE ENCOUNTER
levothyroxine (SYNTHROID/LEVOTHROID) 75 MCG tablet      Last Written Prescription Date:  4/11/18  Last Fill Quantity: 90,   # refills: 1  Last Office Visit : 1/24/18  Future Office visit:  none    Routing refill request to provider for review/approval because:  Failed protocol-abnormal lab TSH.

## 2018-09-25 RX ORDER — LEVOTHYROXINE SODIUM 75 UG/1
75 TABLET ORAL DAILY
Qty: 90 TABLET | Refills: 0 | Status: SHIPPED | OUTPATIENT
Start: 2018-09-25 | End: 2018-12-31

## 2018-11-08 ENCOUNTER — TELEPHONE (OUTPATIENT)
Dept: FAMILY MEDICINE | Facility: CLINIC | Age: 58
End: 2018-11-08

## 2018-11-08 NOTE — LETTER
November 8, 2018      Temo Jalloh  [address] [facility] [place] [address]          Dear Temo Jalloh,     At [facility] we care about your health and are committed to providing quality patient care, which includes staying current on preventative cancer screenings.  You can increase your chances of finding and treating cancers through regular screenings.      Our records show that you are due for the following screening(s):      Colonoscopy for colon cancer - Call [phone] to schedule   Recommended every ten years for everyone age 50 and older  Please take a moment to read over the enclosed information packet about colon cancer screening.   We strongly urge our patient's to consider having a colonoscopy done, which is the best screening test available and only needs to be done every 10 years if normal.      If you have a My-Chart Account, you also can schedule this appointment through there.    If you have already had one or all of the above screening tests at another facility, please call us so that we may update your chart.      Your partners in health,      Quality Committee   [facility]

## 2018-11-08 NOTE — TELEPHONE ENCOUNTER
Panel Management Review      Patient has the following on his problem list: None      Composite cancer screening  Chart review shows that this patient is due/due soon for the following Colonoscopy and Fecal Colorectal (FIT)  Summary:    Patient is due/failing the following:   COLONOSCOPY and FIT    Action needed:   Patient needs referral/order: fit test/ colonoscopy     Type of outreach:    Sent letter.    Questions for provider review:    None                                                                                                                                    Gala Main

## 2018-12-29 DIAGNOSIS — E89.0 POSTABLATIVE HYPOTHYROIDISM: ICD-10-CM

## 2018-12-31 NOTE — TELEPHONE ENCOUNTER
levothyroxine (SYNTHROID/LEVOTHROID) 75 MCG tablet      Last Written Prescription Date:  9-25-18  Last Fill Quantity: 90,   # refills: 0  Last Office Visit : 1-24-18  Future Office visit:  none    Routing refill request to provider for review/approval because:   Abnormal TSH (ordered by PCP)      Scheduling has been notified to contact the pt for appointment.

## 2019-01-02 RX ORDER — LEVOTHYROXINE SODIUM 75 UG/1
75 TABLET ORAL DAILY
Qty: 90 TABLET | Refills: 1 | Status: SHIPPED | OUTPATIENT
Start: 2019-01-02 | End: 2019-03-27

## 2019-02-08 ENCOUNTER — TELEPHONE (OUTPATIENT)
Dept: FAMILY MEDICINE | Facility: CLINIC | Age: 59
End: 2019-02-08

## 2019-02-08 NOTE — TELEPHONE ENCOUNTER
Panel Management Review      Patient has the following on his problem list: None      Composite cancer screening  Chart review shows that this patient is due/due soon for the following Fecal Colorectal (FIT)  Summary:    Patient is due/failing the following:   FIT    Action needed:   Needs to due his fit test     Type of outreach:    Sent letter.    Questions for provider review:    None                                                                                                                                    Gala Main

## 2019-02-08 NOTE — LETTER
February 8, 2019      Temo Jalloh  [address]  [place] [facility] [address]          Dear Temo Jalloh, Please complete your fit test     [facility] and are committed to providing quality patient care, which includes staying current on preventative cancer screenings.  You can increase your chances of finding and treating cancers through regular screenings.      Our records show that you are due for the following screening(s):      Colonoscopy for colon cancer - Call [phone] to schedule   Recommended every ten years for everyone age 50 and older  Please take a moment to read over the enclosed information packet about colon cancer screening.   We strongly urge our patient's to consider having a colonoscopy done, which is the best screening test available and only needs to be done every 10 years if normal.      If you have a My-Chart Account, you also can schedule this appointment through there.    If you have already had one or all of the above screening tests at another facility, please call us so that we may update your chart.      Your partners in health,      Quality Committee   [facility]

## 2019-03-12 ENCOUNTER — TELEPHONE (OUTPATIENT)
Dept: ENDOCRINOLOGY | Facility: CLINIC | Age: 59
End: 2019-03-12

## 2019-03-12 NOTE — TELEPHONE ENCOUNTER
[facility]    Phone Message    May a detailed message be left on voicemail: yes    Reason for Call: Other: PT calling to schedule appt after 2pm, but provider is scheduling out until and of April for afternoon appts. PT will run out of medication on 02APR19, but is unable to take appts earlier than 2pm. Please connect with PT as soon as possible if there are any options for him to be seen in a sooner afternoon.  PT was given option of morning appts sooner.    Action Taken: Message routed to:  [facility] ([facility] Yuri

## 2019-03-13 ENCOUNTER — DOCUMENTATION ONLY (OUTPATIENT)
Dept: CARE COORDINATION | Facility: CLINIC | Age: 59
End: 2019-03-13

## 2019-03-27 ENCOUNTER — OFFICE VISIT (OUTPATIENT)
Dept: ENDOCRINOLOGY | Facility: CLINIC | Age: 59
End: 2019-03-27
Payer: COMMERCIAL

## 2019-03-27 VITALS
BODY MASS INDEX: 27.69 KG/M2 | WEIGHT: 182.7 LBS | SYSTOLIC BLOOD PRESSURE: 123 MMHG | DIASTOLIC BLOOD PRESSURE: 82 MMHG | HEIGHT: 68 IN | HEART RATE: 66 BPM

## 2019-03-27 DIAGNOSIS — E89.0 POSTABLATIVE HYPOTHYROIDISM: Primary | ICD-10-CM

## 2019-03-27 DIAGNOSIS — E05.00 GRAVES' EYE DISEASE: ICD-10-CM

## 2019-03-27 RX ORDER — LEVOTHYROXINE SODIUM 75 UG/1
75 TABLET ORAL DAILY
Qty: 90 TABLET | Refills: 1 | Status: SHIPPED | OUTPATIENT
Start: 2019-03-27 | End: 2019-09-18

## 2019-03-27 ASSESSMENT — MIFFLIN-ST. JEOR: SCORE: 1613.22

## 2019-03-27 ASSESSMENT — PAIN SCALES - GENERAL: PAINLEVEL: NO PAIN (0)

## 2019-03-27 NOTE — LETTER
3/27/2019       RE: eTmo Jalloh  [address]  [place] [facility] [address]     Dear Colleague,    Thank you for referring your patient, Temo Jalloh, to the [facility] ENDOCRINOLOGY at [facility]. Please see a copy of my visit note below.    Endocrinology Clinic Visit 03/27/19  NAME:  Temo Jalloh  PCP:  Sherin Crews  MRN:  [id number]  Reason for Consult: Graves disease, hypothyroidism  Requesting Provider:  Sherin Crews    Chief Complaint     Chief Complaint   Patient presents with     RECHECK     Thyroid        History of Present Illness     Temo Jalloh is a 57 year old male who is seen in clinic for follow-up of Graves' disease and hypothyroidism.  Patient used to see Dr. Garsia who recently retired.  He was diagnosed with Graves' disease in 2013, and was treated with 22 mCi of radioactive iodine in August 2013, shortly after diagnosis.  He developed post ablative hypothyroidism and was started on levothyroxine, which she has been taking since at a dose of 75 mcg daily.    Last visit in January 2018, his TSH was 7.87, with a normal free T4 of 0.9.  He felt euthyroid.  I increased his dose to 88 mcg daily.  The patient sent me a message on my chart after a few weeks, reporting palpitations on this higher dose.  Therefore I reduced him back to 75 mcg p.o. Daily.  Since then, he has had a couple of episodes of atrial tachycardia, for which he underwent a cardiac workup that was negative, and was advised to avoid caffeine.  Since avoiding caffeine, the episodes have not recurred.    He feels fine today.  He has no complaints.  He has maintained a stable weight.  The patient denies any feeling cold, constipation, slowed thinking, dry skin, anxiousness, tremulousness, palpitations, sweating and hair loss.    Also the patient has a history of Graves' ophthalmopathy which he had at the time of his original diagnosis.  He was initially treated with  selenium, however over time he stopped it because he did not think it was helping.  His ophthalmopathy presented as proptosis and diplopia.  He received steroid courses over the last few years.  He was referred to ophthalmology ([facility]) but never went there because he was always feeling better by the time the referral was entered.  He has occasional, very brief episodes of diplopia in the mornings that lasts 1-2 seconds.  He uses eyedrops as needed.  He can close his eyes completely during sleep.     Problem List     Patient Active Problem List   Diagnosis     Allergic rhinitis     LEFT AC ARTHROSIS     Sensorineural hearing loss, asymmetrical     CARDIOVASCULAR SCREENING; LDL GOAL LESS THAN 160     Hyperthyroidism     Graves' eye disease     Hypothyroidism, postablative        Medications     Current Outpatient Medications   Medication     levothyroxine (SYNTHROID/LEVOTHROID) 75 MCG tablet     diclofenac (VOLTAREN) 75 MG EC tablet     No current facility-administered medications for this visit.         Allergies     No Known Allergies    Medical / Surgical History     Past Medical History:   Diagnosis Date     Allergic rhinitis, cause unspecified      Hyperthyroidism 5/23/2013     Injury, other and unspecified, shoulder and upper arm     Left     Other postablative hypothyroidism 12/7/2013     Plantar fascial fibromatosis      Venereal disease, unspecified     Venereal warts     No past surgical history on file.    Social History     Social History     Socioeconomic History     Marital status:      Spouse name: Not on file     Number of children: Not on file     Years of education: Not on file     Highest education level: Not on file   Occupational History     Not on file   Social Needs     Financial resource strain: Not on file     Food insecurity:     Worry: Not on file     Inability: Not on file     Transportation needs:     Medical: Not on file     Non-medical: Not on file   Tobacco Use      Smoking status: Never Smoker     Smokeless tobacco: Never Used   Substance and Sexual Activity     Alcohol use: Yes     Comment: 2 times weekly     Drug use: No     Sexual activity: Not on file   Lifestyle     Physical activity:     Days per week: Not on file     Minutes per session: Not on file     Stress: Not on file   Relationships     Social connections:     Talks on phone: Not on file     Gets together: Not on file     Attends Spiritism service: Not on file     Active member of club or organization: Not on file     Attends meetings of clubs or organizations: Not on file     Relationship status: Not on file     Intimate partner violence:     Fear of current or ex partner: Not on file     Emotionally abused: Not on file     Physically abused: Not on file     Forced sexual activity: Not on file   Other Topics Concern     Parent/sibling w/ CABG, MI or angioplasty before 65F 55M? Yes     Comment: father has 5 stents; starting at age 55   Social History Narrative     Not on file       Family History     Family History   Problem Relation Age of Onset     C.A.D. Father      Hypertension Father      Diabetes Father      Thyroid Disease Mother      Thyroid Disease Sister      Thyroid Disease Maternal Uncle      Thyroid Disease Other         two nieces     Breast Cancer No family hx of      Cancer - colorectal No family hx of        ROS     Constitutional: no fevers, chills, night sweats. No weight loss/gain. No fatigue. Good appetite  Eyes: no vision changes, no eye redness, no diplopia  Ears, Nose, mouth, throat: no hearing changes, no tinnitus, no rhinorrhea, no nasal congestion, no anosmia  Cardiovascular: no chest pain, no orthopnea or PND, no edema, no palpitations  Respiratory: no dyspnea, no cough, no sputum, no wheezing  Gastrointestinal: no nausea, no vomiting, no abdominal pain, no diarrhea, no constipation  Genitourinary: no dysuria, no frequency, no urgency, no nocturia  Musculoskeletal: no joint pains, no  "back pain, no cramps, no fractures  Skin: no rash, no itching, no dryness, no ulcers, no hair loss, no nail changes  Neurological:no headaches, no weakness, no numbness, no tingling, no tremors, no difficulty sleeping, no snoring, no AM sleepiness  Psychiatric: no anxiety, no sadness  Hematologic/lymphatic: no easy bruising, no bleeding, no palor    Physical Exam   /82   Pulse 66   Ht 1.727 m (5' 8\")   Wt 82.9 kg (182 lb 11.2 oz)   BMI 27.78 kg/m        General: Comfortable, no obvious distress, normal body habitus  Eyes: Sclera anicteric, moist conjunctiva, no lid lag, no exophthalmos  HENT: Atraumatic, oropharynx clear, moist mucous membranes with no mucosal ulcerations  Neck: Trachea midline, supple. Thyroid: Thyroid is normal in size and texture  CV: Regular rhythm, normal rate. No murmurs auscultated  Resp: Clear to auscultation bilaterally, good effort  Abdomen:  Soft, non tender, non distended. Bowel sounds heard. No organomegaly. No striae  Skin: No rashes, lesions, or subcutaneous nodules.   Psych: Alert and oriented x 3. Appropriate affect, good insight  Extremities: No peripheral edema  Musculoskeletal: Appropriate muscle bulk and strength  Lymphatic: No cervical lymphadenopathy  Neuro: Moves all four extremities. No focal deficits on limited exam. Gait normal. No resting tremor, deep tendon reflexes with normal relaxation phase.     Labs/Imaging     Pertinent Labs were reviewed and updated in [facility].  Radiology Results were  reviewed and updated in EPIC.    Summary of recent findings:     TSH   Date Value Ref Range Status   03/12/2018 4.69 (H) 0.40 - 4.00 mU/L Final   01/24/2018 7.87 (H) 0.40 - 4.00 mU/L Final   01/15/2016 2.92 0.40 - 4.00 mU/L Final   03/20/2015 2.10 0.40 - 4.00 mU/L Final     Comment:     Effective 7/30/2014, the reference range for this assay has changed to reflect   new instrumentation/methodology.     07/24/2014 1.49 0.4 - 5.0 mU/L Final     T4 Free   Date Value Ref Range " Status   03/12/2018 1.01 0.76 - 1.46 ng/dL Final   01/24/2018 0.90 0.76 - 1.46 ng/dL Final   01/15/2016 1.05 0.76 - 1.46 ng/dL Final   03/20/2015 0.93 0.76 - 1.46 ng/dL Final     Comment:     Effective 7/30/2014, the reference range for this assay has changed to reflect   new instrumentation/methodology.     06/09/2014 1.11 0.70 - 1.85 ng/dL Final       Impression / Plan     1. (E89.0) Postablative hypothyroidism  (primary encounter diagnosis)  Comment: He is taking 75 mcg of levothyroxine.  I had tried him on a higher dose of 88 mcg last year due to elevated TSH, but he did not tolerate that dose.  Symptomatically he seems euthyroid today.   Plan: TSH with free T4 reflex  I refilled his medication at 75 mcg daily, but if labs come back with TSH greater than 10, I will reconsider the dose.    (E05.00) Graves' eye disease  Comment: He has required 3 bursts of steroids since 2013, due to diplopia.  Has never been seen in the Graves' eye clinic.  Is currently asymptomatic for the past 2 years.  Plan: I advised the patient to let me know if he has recurrence of any eye symptoms including pain, pressure, diplopia, more bulging, tearing, inability to close the eyes completely.  At that point I will refer him to the [facility]' [facility] clinic.      Follow up: 1 year      Clive Teague MD  Endocrinology, Diabetes and Metabolism  [facility]

## 2019-03-27 NOTE — PROGRESS NOTES
Endocrinology Clinic Visit 03/27/19  NAME:  Temo Jalloh  PCP:  EleonoraSherinn  MRN:  [id number]  Reason for Consult: Graves disease, hypothyroidism  Requesting Provider:  Sherin Crews    Chief Complaint     Chief Complaint   Patient presents with     RECHECK     Thyroid        History of Present Illness     Temo Jalloh is a 57 year old male who is seen in clinic for follow-up of Graves' disease and hypothyroidism.  Patient used to see Dr. Garsia who recently retired.  He was diagnosed with Graves' disease in 2013, and was treated with 22 mCi of radioactive iodine in August 2013, shortly after diagnosis.  He developed post ablative hypothyroidism and was started on levothyroxine, which she has been taking since at a dose of 75 mcg daily.    Last visit in January 2018, his TSH was 7.87, with a normal free T4 of 0.9.  He felt euthyroid.  I increased his dose to 88 mcg daily.  The patient sent me a message on my chart after a few weeks, reporting palpitations on this higher dose.  Therefore I reduced him back to 75 mcg p.o. Daily.  Since then, he has had a couple of episodes of atrial tachycardia, for which he underwent a cardiac workup that was negative, and was advised to avoid caffeine.  Since avoiding caffeine, the episodes have not recurred.    He feels fine today.  He has no complaints.  He has maintained a stable weight.  The patient denies any feeling cold, constipation, slowed thinking, dry skin, anxiousness, tremulousness, palpitations, sweating and hair loss.    Also the patient has a history of Graves' ophthalmopathy which he had at the time of his original diagnosis.  He was initially treated with selenium, however over time he stopped it because he did not think it was helping.  His ophthalmopathy presented as proptosis and diplopia.  He received steroid courses over the last few years.  He was referred to ophthalmology ([facility]) but never went there because he was  always feeling better by the time the referral was entered.  He has occasional, very brief episodes of diplopia in the mornings that lasts 1-2 seconds.  He uses eyedrops as needed.  He can close his eyes completely during sleep.     Problem List     Patient Active Problem List   Diagnosis     Allergic rhinitis     LEFT AC ARTHROSIS     Sensorineural hearing loss, asymmetrical     CARDIOVASCULAR SCREENING; LDL GOAL LESS THAN 160     Hyperthyroidism     Graves' eye disease     Hypothyroidism, postablative        Medications     Current Outpatient Medications   Medication     levothyroxine (SYNTHROID/LEVOTHROID) 75 MCG tablet     diclofenac (VOLTAREN) 75 MG EC tablet     No current facility-administered medications for this visit.         Allergies     No Known Allergies    Medical / Surgical History     Past Medical History:   Diagnosis Date     Allergic rhinitis, cause unspecified      Hyperthyroidism 5/23/2013     Injury, other and unspecified, shoulder and upper arm     Left     Other postablative hypothyroidism 12/7/2013     Plantar fascial fibromatosis      Venereal disease, unspecified     Venereal warts     No past surgical history on file.    Social History     Social History     Socioeconomic History     Marital status:      Spouse name: Not on file     Number of children: Not on file     Years of education: Not on file     Highest education level: Not on file   Occupational History     Not on file   Social Needs     Financial resource strain: Not on file     Food insecurity:     Worry: Not on file     Inability: Not on file     Transportation needs:     Medical: Not on file     Non-medical: Not on file   Tobacco Use     Smoking status: Never Smoker     Smokeless tobacco: Never Used   Substance and Sexual Activity     Alcohol use: Yes     Comment: 2 times weekly     Drug use: No     Sexual activity: Not on file   Lifestyle     Physical activity:     Days per week: Not on file     Minutes per session:  Not on file     Stress: Not on file   Relationships     Social connections:     Talks on phone: Not on file     Gets together: Not on file     Attends [employer] service: Not on file     Active member of club or organization: Not on file     Attends meetings of clubs or organizations: Not on file     Relationship status: Not on file     Intimate partner violence:     Fear of current or ex partner: Not on file     Emotionally abused: Not on file     Physically abused: Not on file     Forced sexual activity: Not on file   Other Topics Concern     Parent/sibling w/ CABG, MI or angioplasty before 65F 55M? Yes     Comment: father has 5 stents; starting at age 55   Social History Narrative     Not on file       Family History     Family History   Problem Relation Age of Onset     C.A.D. Father      Hypertension Father      Diabetes Father      Thyroid Disease Mother      Thyroid Disease Sister      Thyroid Disease Maternal Uncle      Thyroid Disease Other         two nieces     Breast Cancer No family hx of      Cancer - colorectal No family hx of        ROS     Constitutional: no fevers, chills, night sweats. No weight loss/gain. No fatigue. Good appetite  Eyes: no vision changes, no eye redness, no diplopia  Ears, Nose, mouth, throat: no hearing changes, no tinnitus, no rhinorrhea, no nasal congestion, no anosmia  Cardiovascular: no chest pain, no orthopnea or PND, no edema, no palpitations  Respiratory: no dyspnea, no cough, no sputum, no wheezing  Gastrointestinal: no nausea, no vomiting, no abdominal pain, no diarrhea, no constipation  Genitourinary: no dysuria, no frequency, no urgency, no nocturia  Musculoskeletal: no joint pains, no back pain, no cramps, no fractures  Skin: no rash, no itching, no dryness, no ulcers, no hair loss, no nail changes  Neurological:no headaches, no weakness, no numbness, no tingling, no tremors, no difficulty sleeping, no snoring, no AM sleepiness  Psychiatric: no anxiety, no  "sadness  Hematologic/lymphatic: no easy bruising, no bleeding, no palor    Physical Exam   /82   Pulse 66   Ht 1.727 m (5' 8\")   Wt 82.9 kg (182 lb 11.2 oz)   BMI 27.78 kg/m       General: Comfortable, no obvious distress, normal body habitus  Eyes: Sclera anicteric, moist conjunctiva, no lid lag, no exophthalmos  HENT: Atraumatic, oropharynx clear, moist mucous membranes with no mucosal ulcerations  Neck: Trachea midline, supple. Thyroid: Thyroid is normal in size and texture  CV: Regular rhythm, normal rate. No murmurs auscultated  Resp: Clear to auscultation bilaterally, good effort  Abdomen:  Soft, non tender, non distended. Bowel sounds heard. No organomegaly. No striae  Skin: No rashes, lesions, or subcutaneous nodules.   Psych: Alert and oriented x 3. Appropriate affect, good insight  Extremities: No peripheral edema  Musculoskeletal: Appropriate muscle bulk and strength  Lymphatic: No cervical lymphadenopathy  Neuro: Moves all four extremities. No focal deficits on limited exam. Gait normal. No resting tremor, deep tendon reflexes with normal relaxation phase.     Labs/Imaging     Pertinent Labs were reviewed and updated in [facility].  Radiology Results were  reviewed and updated in [facility].    Summary of recent findings:     TSH   Date Value Ref Range Status   03/12/2018 4.69 (H) 0.40 - 4.00 mU/L Final   01/24/2018 7.87 (H) 0.40 - 4.00 mU/L Final   01/15/2016 2.92 0.40 - 4.00 mU/L Final   03/20/2015 2.10 0.40 - 4.00 mU/L Final     Comment:     Effective 7/30/2014, the reference range for this assay has changed to reflect   new instrumentation/methodology.     07/24/2014 1.49 0.4 - 5.0 mU/L Final     T4 Free   Date Value Ref Range Status   03/12/2018 1.01 0.76 - 1.46 ng/dL Final   01/24/2018 0.90 0.76 - 1.46 ng/dL Final   01/15/2016 1.05 0.76 - 1.46 ng/dL Final   03/20/2015 0.93 0.76 - 1.46 ng/dL Final     Comment:     Effective 7/30/2014, the reference range for this assay has changed to reflect   new " instrumentation/methodology.     06/09/2014 1.11 0.70 - 1.85 ng/dL Final       Impression / Plan     1. (E89.0) Postablative hypothyroidism  (primary encounter diagnosis)  Comment: He is taking 75 mcg of levothyroxine.  I had tried him on a higher dose of 88 mcg last year due to elevated TSH, but he did not tolerate that dose.  Symptomatically he seems euthyroid today.   Plan: TSH with free T4 reflex  I refilled his medication at 75 mcg daily, but if labs come back with TSH greater than 10, I will reconsider the dose.    (E05.00) Graves' eye disease  Comment: He has required 3 bursts of steroids since 2013, due to diplopia.  Has never been seen in the [facility].  Is currently asymptomatic for the past 2 years.  Plan: I advised the patient to let me know if he has recurrence of any eye symptoms including pain, pressure, diplopia, more bulging, tearing, inability to close the eyes completely.  At that point I will refer him to the [facility].      Follow up: 1 year      Clive Teague MD  Endocrinology, Diabetes and Metabolism  [facility]

## 2019-03-28 DIAGNOSIS — E89.0 POSTABLATIVE HYPOTHYROIDISM: ICD-10-CM

## 2019-03-28 LAB
T4 FREE SERPL-MCNC: 0.99 NG/DL (ref 0.76–1.46)
TSH SERPL DL<=0.005 MIU/L-ACNC: 4.67 MU/L (ref 0.4–4)

## 2019-03-28 PROCEDURE — 36415 COLL VENOUS BLD VENIPUNCTURE: CPT | Performed by: INTERNAL MEDICINE

## 2019-03-28 PROCEDURE — 84443 ASSAY THYROID STIM HORMONE: CPT | Performed by: INTERNAL MEDICINE

## 2019-03-28 PROCEDURE — 84439 ASSAY OF FREE THYROXINE: CPT | Performed by: INTERNAL MEDICINE

## 2019-09-16 DIAGNOSIS — E89.0 POSTABLATIVE HYPOTHYROIDISM: ICD-10-CM

## 2019-09-17 NOTE — TELEPHONE ENCOUNTER
levothyroxine (SYNTHROID/LEVOTHROID) 75 MCG tablet   Last Written Prescription Date:  3/27/19  Last Fill Quantity: 90,   # refills: 1  Last Office Visit :3/27/19  Future Office visit: none    Routing refill request to provider for review/approval because:  Abnormal tsh

## 2019-09-18 RX ORDER — LEVOTHYROXINE SODIUM 75 UG/1
75 TABLET ORAL DAILY
Qty: 90 TABLET | Refills: 1 | Status: SHIPPED | OUTPATIENT
Start: 2019-09-18 | End: 2020-03-19

## 2019-10-24 ENCOUNTER — TELEPHONE (OUTPATIENT)
Dept: FAMILY MEDICINE | Facility: CLINIC | Age: 59
End: 2019-10-24

## 2019-10-24 NOTE — LETTER
October 24, 2019    Temo Jalloh  [address]  [place] [address]    Dear Palomo Plascencia cares about your health and your health plan.  I have reviewed your medical conditions, medication list and lab results, and am making recommendations based on this review to better manage your health.    You are in particular need of attention regarding:  -Colon Cancer Screening  -Wellness (Physical) Visit     I am recommending that you:     -schedule a WELLNESS (Physical) APPOINTMENT with me.       -schedule a COLONOSCOPY to look for colon cancer (due every 10 years or 5 years in higher risk situations.)        Colon cancer is now the second leading cause of cancer-related deaths in the [place] [facility] for both men and women and there are over 130,000 new cases and 50,000 deaths per year from colon cancer.  Colonoscopies can prevent 90-95% of these deaths.  Problem lesions can be removed before they ever become cancer.  This test is not only looking for cancer, but also getting rid of precancerious lesions.    If you are under/uninsured, we recommend you contact the Delivered program. Delivered is a free colorectal cancer screening program that provides colonoscopies for eligible under/uninsured [place] men and women. If you are interested in receiving a free colonoscopy, please call Delivered at [phone] (mention code [employer]) to see if you re eligible.      If you do not wish to do a colonoscopy or cannot afford to do one, at this time, there is another option. It is called a FIT test or Fecal Immunochemical Occult Blood Test (take home stool sample kit).  It does not replace the colonoscopy for colorectal cancer screening, but it can detect hidden bleeding in the lower colon.  It does need to be repeated every year and if a positive result is obtained, you would be referred for a colonoscopy.          If you have completed either one of these tests at another facility, please call with the  details of when and where the tests were done and if they were normal or not. Or have the records sent to our clinic so that we can best coordinate your care.      Please call us at the [phone] or use [facility] to address the above recommendations.     Thank you for trusting [facility].  We appreciate the opportunity to serve you and look forward to supporting your healthcare in the future.    If you have (or plan to have) any of these tests done at a facility other than a [facility] or a [facility], please have the results sent to the [facility] location noted above.      Best Regards,    Sheba Post MD

## 2019-10-24 NOTE — TELEPHONE ENCOUNTER
Panel Management Review      Patient has the following on his problem list: None      Composite cancer screening  Chart review shows that this patient is due/due soon for the following Colonoscopy  Summary:    Patient is due/failing the following:   COLONOSCOPY and PHYSICAL    Action needed:   Patient needs office visit for Physical. and Patient needs referral/order: colonoscopy    Type of outreach:    Sent letter.    Questions for provider review:    None                                                                                                                                    Wanda Stroud CMA

## 2019-11-03 ENCOUNTER — HEALTH MAINTENANCE LETTER (OUTPATIENT)
Age: 59
End: 2019-11-03

## 2020-03-18 DIAGNOSIS — E89.0 POSTABLATIVE HYPOTHYROIDISM: ICD-10-CM

## 2020-03-19 RX ORDER — LEVOTHYROXINE SODIUM 75 UG/1
75 TABLET ORAL DAILY
Qty: 90 TABLET | Refills: 0 | Status: SHIPPED | OUTPATIENT
Start: 2020-03-19 | End: 2020-06-19

## 2020-03-19 NOTE — TELEPHONE ENCOUNTER
levothyroxine (SYNTHROID/LEVOTHROID) 75 MCG tablet       Last Written Prescription Date:  9/18/19  Last Fill Quantity: 90,   # refills: 1  Last Office Visit : 3/27/19  Future Office visit:  none    Routing refill request to provider for review/approval because:  Failed protocol: abnormal lab- TSH    TSH   Date Value Ref Range Status   03/28/2019 4.67 (H) 0.40 - 4.00 mU/L Final

## 2020-06-18 DIAGNOSIS — E89.0 POSTABLATIVE HYPOTHYROIDISM: ICD-10-CM

## 2020-06-19 RX ORDER — LEVOTHYROXINE SODIUM 75 UG/1
75 TABLET ORAL DAILY
Qty: 30 TABLET | Refills: 0 | Status: SHIPPED | OUTPATIENT
Start: 2020-06-19 | End: 2020-07-20

## 2020-06-19 NOTE — TELEPHONE ENCOUNTER
levothyroxine (SYNTHROID/LEVOTHROID) 75 MCG tablet       Last Written Prescription Date:  3-19-20  Last Fill Quantity: 90,   # refills: 0  Last Office Visit : 3-27-19 ( RTC 1 Y)  Future Office visit:  none      TSH   Date Value Ref Range Status   03/28/2019 4.67 (H) 0.40 - 4.00 mU/L Final     Routing refill request to provider for review/approval because:  Overdue lab: TSH    Scheduling has been notified to contact the pt for appointment.    30 day Rx pending

## 2020-07-14 NOTE — PROGRESS NOTES
"Temo Jalloh is a 59 year old male who is being evaluated via a billable telephone visit.      The patient has been notified of following:     \"This telephone visit will be conducted via a call between you and your physician/provider. We have found that certain health care needs can be provided without the need for a physical exam.  This service lets us provide the care you need with a short phone conversation.  If a prescription is necessary we can send it directly to your pharmacy.  If lab work is needed we can place an order for that and you can then stop by our lab to have the test done at a later time.    Telephone visits are billed at different rates depending on your insurance coverage. During this emergency period, for some insurers they may be billed the same as an in-person visit.  Please reach out to your insurance provider with any questions.    If during the course of the call the physician/provider feels a telephone visit is not appropriate, you will not be charged for this service.\"    Patient has given verbal consent for Telephone visit?  Yes    What phone number would you like to be contacted at? [phone]    How would you like to obtain your AVS? Mail a copy    Phone call duration: *** minutes    {signature options:[id number]}      "

## 2020-07-15 ENCOUNTER — VIRTUAL VISIT (OUTPATIENT)
Dept: ENDOCRINOLOGY | Facility: CLINIC | Age: 60
End: 2020-07-15
Payer: COMMERCIAL

## 2020-07-15 DIAGNOSIS — E89.0 POSTABLATIVE HYPOTHYROIDISM: Primary | ICD-10-CM

## 2020-07-15 DIAGNOSIS — E05.00 GRAVES' EYE DISEASE: ICD-10-CM

## 2020-07-15 NOTE — PROGRESS NOTES
"Temo Jalloh is a 59 year old male who is being evaluated via a billable telephone visit.      The patient has been notified of following:     \"This telephone visit will be conducted via a call between you and your physician/provider. We have found that certain health care needs can be provided without the need for a physical exam.  This service lets us provide the care you need with a short phone conversation.  If a prescription is necessary we can send it directly to your pharmacy.  If lab work is needed we can place an order for that and you can then stop by our lab to have the test done at a later time.    Telephone visits are billed at different rates depending on your insurance coverage. During this emergency period, for some insurers they may be billed the same as an in-person visit.  Please reach out to your insurance provider with any questions.    If during the course of the call the physician/provider feels a telephone visit is not appropriate, you will not be charged for this service.\"    Patient has given verbal consent for Telephone visit?  Yes    What phone number would you like to be contacted at? [phone]    How would you like to obtain your AVS? Mail a copy    Phone call duration: 15 minutes    Clive Teague MD    Endocrinology Clinic Visit 07/15/20  NAME:  Temo Jalloh  PCP:  Sherin Crews  MRN:  [id number]  Reason for Consult: Graves disease, hypothyroidism  Requesting Provider:  Sherin Crews    Chief Complaint     Chief Complaint   Patient presents with     RECHECK     THYROID       History of Present Illness     Temo Jalloh is a 59 year old male who is evaluated via telephone visit for follow-up of Graves' disease and hypothyroidism. He was diagnosed with Graves' disease in 2013, and was treated with 22 mCi of radioactive iodine in August 2013, shortly after diagnosis.  He developed post ablative hypothyroidism and was started on levothyroxine, which she " has been taking since at a dose of 75 mcg daily.      January 2018, his TSH was 7.87, with a normal free T4 of 0.9.  He felt euthyroid.  I increased his dose to 88 mcg daily.  The patient sent me a message on my chart after a few weeks, reporting palpitations on this higher dose.  Therefore I reduced him back to 75 mcg p.o. Daily.    Since then, he has had a couple of episodes of atrial tachycardia, for which he underwent a cardiac workup that was negative, and was advised to avoid caffeine.  Since avoiding caffeine, the episodes have not recurred.    Also the patient has a history of Graves' ophthalmopathy which he had at the time of his original diagnosis.  He was initially treated with selenium, however over time he stopped it because he did not think it was helping.  His ophthalmopathy presented as proptosis and diplopia.  He received steroid courses over the last few years.  He was referred to ophthalmology ([facility]) but never went there because he was always feeling better by the time the referral was entered.  He has occasional, very brief episodes of diplopia in the mornings that lasts 1-2 seconds.  He uses eyedrops as needed.  He can close his eyes completely during sleep.     Interval History:   Last visit March 2019, TSH was 4.67. I continued him on the same dose of levothyroxine.   He feels fine today.  He has no complaints.  He has maintained a stable weight.  The patient denies any feeling cold, constipation, slowed thinking, dry skin, anxiousness, tremulousness, palpitations, sweating and hair loss.  Eye symptoms are about the same, stable.   He has been taking his 75 mcg daily levothyroxine. Does not forget or miss any doses.       Problem List     Patient Active Problem List   Diagnosis     Allergic rhinitis     LEFT AC ARTHROSIS     Sensorineural hearing loss, asymmetrical     CARDIOVASCULAR SCREENING; LDL GOAL LESS THAN 160     Hyperthyroidism     Graves' eye disease     Hypothyroidism,  postablative        Medications     Current Outpatient Medications   Medication     diclofenac (VOLTAREN) 75 MG EC tablet     levothyroxine (SYNTHROID/LEVOTHROID) 75 MCG tablet     No current facility-administered medications for this visit.         Allergies     No Known Allergies    Medical / Surgical History     Past Medical History:   Diagnosis Date     Allergic rhinitis, cause unspecified      Hyperthyroidism 5/23/2013     Injury, other and unspecified, shoulder and upper arm     Left     Other postablative hypothyroidism 12/7/2013     Plantar fascial fibromatosis      Venereal disease, unspecified     Venereal warts     No past surgical history on file.    Social History     Social History     Socioeconomic History     Marital status:      Spouse name: Not on file     Number of children: Not on file     Years of education: Not on file     Highest education level: Not on file   Occupational History     Not on file   Social Needs     Financial resource strain: Not on file     Food insecurity     Worry: Not on file     Inability: Not on file     Transportation needs     Medical: Not on file     Non-medical: Not on file   Tobacco Use     Smoking status: Never Smoker     Smokeless tobacco: Never Used   Substance and Sexual Activity     Alcohol use: Yes     Comment: 2 times weekly     Drug use: No     Sexual activity: Not on file   Lifestyle     Physical activity     Days per week: Not on file     Minutes per session: Not on file     Stress: Not on file   Relationships     Social connections     Talks on phone: Not on file     Gets together: Not on file     Attends Hinduism service: Not on file     Active member of club or organization: Not on file     Attends meetings of clubs or organizations: Not on file     Relationship status: Not on file     Intimate partner violence     Fear of current or ex partner: Not on file     Emotionally abused: Not on file     Physically abused: Not on file     Forced sexual  activity: Not on file   Other Topics Concern     Parent/sibling w/ CABG, MI or angioplasty before 65F 55M? Yes     Comment: father has 5 stents; starting at age 55   Social History Narrative     Not on file       Family History     Family History   Problem Relation Age of Onset     C.A.D. Father      Hypertension Father      Diabetes Father      Thyroid Disease Mother      Thyroid Disease Sister      Thyroid Disease Maternal Uncle      Thyroid Disease Other         two nieces     Breast Cancer No family hx of      Cancer - colorectal No family hx of        ROS     Constitutional: no fevers, chills, night sweats. No weight loss/gain. No fatigue. Good appetite  Eyes: no vision changes, no eye redness, no diplopia  Ears, Nose, mouth, throat: no hearing changes, no tinnitus, no rhinorrhea, no nasal congestion, no anosmia  Cardiovascular: no chest pain, no orthopnea or PND, no edema, no palpitations  Respiratory: no dyspnea, no cough, no sputum, no wheezing  Gastrointestinal: no nausea, no vomiting, no abdominal pain, no diarrhea, no constipation  Genitourinary: no dysuria, no frequency, no urgency, no nocturia  Musculoskeletal: no joint pains, no back pain, no cramps, no fractures  Skin: no rash, no itching, no dryness, no ulcers, no hair loss, no nail changes  Neurological:no headaches, no weakness, no numbness, no tingling, no tremors, no difficulty sleeping, no snoring, no AM sleepiness  Psychiatric: no anxiety, no sadness  Hematologic/lymphatic: no easy bruising, no bleeding, no palor    Physical Exam   There were no vitals taken for this visit.   No exam was performed since this was a telephone visit.     Labs/Imaging     Pertinent Labs were reviewed and updated in [facility].  Radiology Results were  reviewed and updated in EPIC.    Summary of recent findings:     TSH   Date Value Ref Range Status   03/28/2019 4.67 (H) 0.40 - 4.00 mU/L Final   03/12/2018 4.69 (H) 0.40 - 4.00 mU/L Final   01/24/2018 7.87 (H) 0.40 - 4.00  mU/L Final   01/15/2016 2.92 0.40 - 4.00 mU/L Final   03/20/2015 2.10 0.40 - 4.00 mU/L Final     Comment:     Effective 7/30/2014, the reference range for this assay has changed to reflect   new instrumentation/methodology.       T4 Free   Date Value Ref Range Status   03/28/2019 0.99 0.76 - 1.46 ng/dL Final   03/12/2018 1.01 0.76 - 1.46 ng/dL Final   01/24/2018 0.90 0.76 - 1.46 ng/dL Final   01/15/2016 1.05 0.76 - 1.46 ng/dL Final   03/20/2015 0.93 0.76 - 1.46 ng/dL Final     Comment:     Effective 7/30/2014, the reference range for this assay has changed to reflect   new instrumentation/methodology.         Impression / Plan     1. (E89.0) Postablative hypothyroidism  (primary encounter diagnosis)  Comment: He is taking 75 mcg of levothyroxine.  I had tried him on a higher dose of 88 mcg due to elevated TSH, but he did not tolerate that dose.  Symptomatically he seems euthyroid today.   Plan: TSH with free T4 reflex  Will refill his med according to the lab results.       (E05.00) Graves' eye disease  Comment: He has required 3 bursts of steroids since 2013, due to diplopia.  Has never been seen in the [facility]' eye clinic.  Is currently asymptomatic for the past 2 years.  Plan: I advised the patient to let me know if he has recurrence of any eye symptoms including pain, pressure, diplopia, more bulging, tearing, inability to close the eyes completely.  At that point I will refer him to the [facility].      Follow up: 1 year      Clive Teague MD  Endocrinology, Diabetes and Metabolism  [facility]

## 2020-07-15 NOTE — LETTER
7/15/2020       RE: Temo Jalloh  [address]  [facility] [address]     Dear Colleague,    Thank you for referring your patient, Temo Jalloh, to the [facility] ENDOCRINOLOGY at [facility]. Please see a copy of my visit note below.    Temo Jalloh is a 59 year old male who is being evaluated via a billable telephone visit.          Phone call duration: 15 minutes    Clive Teague MD    Endocrinology Clinic Visit 07/15/20  NAME:  Temo Jalloh  PCP:  Sherin Crews  MRN:  [id number]  Reason for Consult: Graves disease, hypothyroidism  Requesting Provider:  Sherin Crews    Chief Complaint     Chief Complaint   Patient presents with     RECHECK     THYROID       History of Present Illness     Temo Jalloh is a 59 year old male who is evaluated via telephone visit for follow-up of Graves' disease and hypothyroidism. He was diagnosed with Graves' disease in 2013, and was treated with 22 mCi of radioactive iodine in August 2013, shortly after diagnosis.  He developed post ablative hypothyroidism and was started on levothyroxine, which she has been taking since at a dose of 75 mcg daily.      January 2018, his TSH was 7.87, with a normal free T4 of 0.9.  He felt euthyroid.  I increased his dose to 88 mcg daily.  The patient sent me a message on my chart after a few weeks, reporting palpitations on this higher dose.  Therefore I reduced him back to 75 mcg p.o. Daily.    Since then, he has had a couple of episodes of atrial tachycardia, for which he underwent a cardiac workup that was negative, and was advised to avoid caffeine.  Since avoiding caffeine, the episodes have not recurred.    Also the patient has a history of Graves' ophthalmopathy which he had at the time of his original diagnosis.  He was initially treated with selenium, however over time he stopped it because he did not think it was helping.  His ophthalmopathy presented as  proptosis and diplopia.  He received steroid courses over the last few years.  He was referred to ophthalmology ([facility]) but never went there because he was always feeling better by the time the referral was entered.  He has occasional, very brief episodes of diplopia in the mornings that lasts 1-2 seconds.  He uses eyedrops as needed.  He can close his eyes completely during sleep.     Interval History:   Last visit March 2019, TSH was 4.67. I continued him on the same dose of levothyroxine.   He feels fine today.  He has no complaints.  He has maintained a stable weight.  The patient denies any feeling cold, constipation, slowed thinking, dry skin, anxiousness, tremulousness, palpitations, sweating and hair loss.  Eye symptoms are about the same, stable.   He has been taking his 75 mcg daily levothyroxine. Does not forget or miss any doses.       Problem List     Patient Active Problem List   Diagnosis     Allergic rhinitis     LEFT AC ARTHROSIS     Sensorineural hearing loss, asymmetrical     CARDIOVASCULAR SCREENING; LDL GOAL LESS THAN 160     Hyperthyroidism     Graves' eye disease     Hypothyroidism, postablative        Medications     Current Outpatient Medications   Medication     diclofenac (VOLTAREN) 75 MG EC tablet     levothyroxine (SYNTHROID/LEVOTHROID) 75 MCG tablet     No current facility-administered medications for this visit.         Allergies     No Known Allergies    Medical / Surgical History     Past Medical History:   Diagnosis Date     Allergic rhinitis, cause unspecified      Hyperthyroidism 5/23/2013     Injury, other and unspecified, shoulder and upper arm     Left     Other postablative hypothyroidism 12/7/2013     Plantar fascial fibromatosis      Venereal disease, unspecified     Venereal warts     No past surgical history on file.    Social History     Social History     Socioeconomic History     Marital status:      Spouse name: Not on file     Number of children:  Not on file     Years of education: Not on file     Highest education level: Not on file   Occupational History     Not on file   Social Needs     Financial resource strain: Not on file     Food insecurity     Worry: Not on file     Inability: Not on file     Transportation needs     Medical: Not on file     Non-medical: Not on file   Tobacco Use     Smoking status: Never Smoker     Smokeless tobacco: Never Used   Substance and Sexual Activity     Alcohol use: Yes     Comment: 2 times weekly     Drug use: No     Sexual activity: Not on file   Lifestyle     Physical activity     Days per week: Not on file     Minutes per session: Not on file     Stress: Not on file   Relationships     Social connections     Talks on phone: Not on file     Gets together: Not on file     Attends Buddhist service: Not on file     Active member of club or organization: Not on file     Attends meetings of clubs or organizations: Not on file     Relationship status: Not on file     Intimate partner violence     Fear of current or ex partner: Not on file     Emotionally abused: Not on file     Physically abused: Not on file     Forced sexual activity: Not on file   Other Topics Concern     Parent/sibling w/ CABG, MI or angioplasty before 65F 55M? Yes     Comment: father has 5 stents; starting at age 55   Social History Narrative     Not on file       Family History     Family History   Problem Relation Age of Onset     C.A.D. Father      Hypertension Father      Diabetes Father      Thyroid Disease Mother      Thyroid Disease Sister      Thyroid Disease Maternal Uncle      Thyroid Disease Other         two nieces     Breast Cancer No family hx of      Cancer - colorectal No family hx of        ROS     Constitutional: no fevers, chills, night sweats. No weight loss/gain. No fatigue. Good appetite  Eyes: no vision changes, no eye redness, no diplopia  Ears, Nose, mouth, throat: no hearing changes, no tinnitus, no rhinorrhea, no nasal  congestion, no anosmia  Cardiovascular: no chest pain, no orthopnea or PND, no edema, no palpitations  Respiratory: no dyspnea, no cough, no sputum, no wheezing  Gastrointestinal: no nausea, no vomiting, no abdominal pain, no diarrhea, no constipation  Genitourinary: no dysuria, no frequency, no urgency, no nocturia  Musculoskeletal: no joint pains, no back pain, no cramps, no fractures  Skin: no rash, no itching, no dryness, no ulcers, no hair loss, no nail changes  Neurological:no headaches, no weakness, no numbness, no tingling, no tremors, no difficulty sleeping, no snoring, no AM sleepiness  Psychiatric: no anxiety, no sadness  Hematologic/lymphatic: no easy bruising, no bleeding, no palor    Physical Exam   There were no vitals taken for this visit.   No exam was performed since this was a telephone visit.     Labs/Imaging     Pertinent Labs were reviewed and updated in [email or web address].  Radiology Results were  reviewed and updated in EPIC.    Summary of recent findings:     TSH   Date Value Ref Range Status   03/28/2019 4.67 (H) 0.40 - 4.00 mU/L Final   03/12/2018 4.69 (H) 0.40 - 4.00 mU/L Final   01/24/2018 7.87 (H) 0.40 - 4.00 mU/L Final   01/15/2016 2.92 0.40 - 4.00 mU/L Final   03/20/2015 2.10 0.40 - 4.00 mU/L Final     Comment:     Effective 7/30/2014, the reference range for this assay has changed to reflect   new instrumentation/methodology.       T4 Free   Date Value Ref Range Status   03/28/2019 0.99 0.76 - 1.46 ng/dL Final   03/12/2018 1.01 0.76 - 1.46 ng/dL Final   01/24/2018 0.90 0.76 - 1.46 ng/dL Final   01/15/2016 1.05 0.76 - 1.46 ng/dL Final   03/20/2015 0.93 0.76 - 1.46 ng/dL Final     Comment:     Effective 7/30/2014, the reference range for this assay has changed to reflect   new instrumentation/methodology.         Impression / Plan     1. (E89.0) Postablative hypothyroidism  (primary encounter diagnosis)  Comment: He is taking 75 mcg of levothyroxine.  I had tried him on a higher dose of 88 mcg due  to elevated TSH, but he did not tolerate that dose.  Symptomatically he seems euthyroid today.   Plan: TSH with free T4 reflex  Will refill his med according to the lab results.       (E05.00) Graves' eye disease  Comment: He has required 3 bursts of steroids since 2013, due to diplopia.  Has never been seen in the [facility]' eye clinic.  Is currently asymptomatic for the past 2 years.  Plan: I advised the patient to let me know if he has recurrence of any eye symptoms including pain, pressure, diplopia, more bulging, tearing, inability to close the eyes completely.  At that point I will refer him to the [facility]' eye clinic.      Follow up: 1 year      Clive Teague MD  Endocrinology, Diabetes and Metabolism  [facility]

## 2020-07-16 DIAGNOSIS — E89.0 POSTABLATIVE HYPOTHYROIDISM: ICD-10-CM

## 2020-07-20 RX ORDER — LEVOTHYROXINE SODIUM 75 UG/1
75 TABLET ORAL DAILY
Qty: 90 TABLET | Refills: 0 | Status: SHIPPED | OUTPATIENT
Start: 2020-07-20 | End: 2020-07-31

## 2020-07-20 NOTE — TELEPHONE ENCOUNTER
levothyroxine (SYNTHROID/LEVOTHROID) 75 MCG tablet    Take 1 tablet (75 mcg) by mouth daily     Last Written Prescription Date:  6/19/20  Last Fill Quantity: 30,   # refills: 0  Last Office Visit : 7/15/20 virtual visit   Comment: He is taking 75 mcg of levothyroxine.  I had tried him on a higher dose of 88 mcg due to elevated TSH, but he did not tolerate that dose.  Symptomatically he seems euthyroid today.   Plan: TSH with free T4 reflex  Will refill his med according to the lab results.   Follow up: 1 year  Future Office visit:  none    TSH abnormal. 90 day pended. Lab appointment 7/23/20    Routing refill request to provider for review/approval because:  Abnormal lab - TSH    Lab Test 03/28/19  1526   TSH 4.67*

## 2020-07-23 DIAGNOSIS — E89.0 POSTABLATIVE HYPOTHYROIDISM: ICD-10-CM

## 2020-07-23 LAB
T4 FREE SERPL-MCNC: 1.1 NG/DL (ref 0.76–1.46)
TSH SERPL DL<=0.005 MIU/L-ACNC: 6.78 MU/L (ref 0.4–4)

## 2020-07-23 PROCEDURE — 84443 ASSAY THYROID STIM HORMONE: CPT | Performed by: INTERNAL MEDICINE

## 2020-07-23 PROCEDURE — 36415 COLL VENOUS BLD VENIPUNCTURE: CPT | Performed by: INTERNAL MEDICINE

## 2020-07-23 PROCEDURE — 84439 ASSAY OF FREE THYROXINE: CPT | Performed by: INTERNAL MEDICINE

## 2020-07-29 ENCOUNTER — TELEPHONE (OUTPATIENT)
Dept: ENDOCRINOLOGY | Facility: CLINIC | Age: 60
End: 2020-07-29

## 2020-07-29 DIAGNOSIS — E89.0 POSTABLATIVE HYPOTHYROIDISM: ICD-10-CM

## 2020-07-29 DIAGNOSIS — E89.0 POSTABLATIVE HYPOTHYROIDISM: Primary | ICD-10-CM

## 2020-07-29 NOTE — TELEPHONE ENCOUNTER
----- Message from Clive Teague MD sent at 7/29/2020  8:23 AM CDT -----  Please call the patient and let him know his TSH is 6.7. Ideally should be at 4 or below, which means we need to increase his dose to 88 mcg. Last time we did that he had palpitations. It would be fine if he stayed at this dose too, as long as TSH remains below 10. If he is feeling fine, continue current dose. Please let me know what he decides (reply back), so I know which dose to refill.   Thanks  Clive Teague MD  Endocrinology, Diabetes and Metabolism  [facility]

## 2020-07-29 NOTE — TELEPHONE ENCOUNTER
Temo prefers to stay on the current dose to avoid the palpitations. He feels fine Stacy Zelaya RN on 7/29/2020 at 5:14 PM

## 2020-07-30 RX ORDER — LEVOTHYROXINE SODIUM 75 UG/1
75 TABLET ORAL DAILY
Qty: 90 TABLET | Refills: 3 | Status: CANCELLED | OUTPATIENT
Start: 2020-07-30

## 2020-07-30 NOTE — TELEPHONE ENCOUNTER
levothyroxine (SYNTHROID/LEVOTHROID) 75 MCG tablet   Last Written Prescription Date:  7/20/20  Last Fill Quantity: 90,   # refills: 0  Last Office Visit : 7/15/20  Future Office visit:  None  7/23/20:  T4 Free  0.76 - 1.46 ng/dL  1.10    Medication refilled 7/20/20 x 90  , notes reviewed as below.  Pended 90 days with 3 refills to Dr Teague.       Stacy Zelaya RN           7/29/20 5:15 PM   Note      Teom prefers to stay on the current dose to avoid the palpitations. He feels fine Stacy Zelaya RN on 7/29/2020 at 5:14 PM         Stacy Zelaya RN           7/29/20 5:14 PM   Note      ----- Message from Clive Teague MD sent at 7/29/2020  8:23 AM CDT -----  Please call the patient and let him know his TSH is 6.7. Ideally should be at 4 or below, which means we need to increase his dose to 88 mcg. Last time we did that he had palpitations. It would be fine if he stayed at this dose too, as long as TSH remains below 10. If he is feeling fine, continue current dose. Please let me know what he decides (reply back), so I know which dose to refill.   Thanks  Clive Teague MD  Endocrinology, Diabetes and Metabolism  [facility]

## 2020-07-31 RX ORDER — LEVOTHYROXINE SODIUM 75 UG/1
75 TABLET ORAL DAILY
Qty: 90 TABLET | Refills: 3 | Status: SHIPPED | OUTPATIENT
Start: 2020-07-31 | End: 2021-10-29

## 2021-06-23 ENCOUNTER — TELEPHONE (OUTPATIENT)
Dept: ENDOCRINOLOGY | Facility: CLINIC | Age: 61
End: 2021-06-23

## 2021-10-06 DIAGNOSIS — E89.0 POSTABLATIVE HYPOTHYROIDISM: ICD-10-CM

## 2021-10-06 RX ORDER — LEVOTHYROXINE SODIUM 75 UG/1
75 TABLET ORAL DAILY
Qty: 30 TABLET | Status: CANCELLED | OUTPATIENT
Start: 2021-10-06

## 2021-10-06 NOTE — TELEPHONE ENCOUNTER
levothyroxine (SYNTHROID/LEVOTHROID) 75 MCG tablet  Last Written Prescription Date:  7/31/2020  Last Fill Quantity: 90,   # refills: 3  Last Office Visit : 7/15/2020  Future Office visit: None    Routing refill request to provider for review/approval because:  Second Request       Over due office visit and Labs  Refer to Provider for review       Antionette Rodriguez RN  Central Triage Red Flags/Med Refills

## 2021-10-26 ENCOUNTER — OFFICE VISIT (OUTPATIENT)
Dept: FAMILY MEDICINE | Facility: CLINIC | Age: 61
End: 2021-10-26
Payer: COMMERCIAL

## 2021-10-26 VITALS
HEIGHT: 66 IN | OXYGEN SATURATION: 98 % | DIASTOLIC BLOOD PRESSURE: 86 MMHG | HEART RATE: 67 BPM | WEIGHT: 187.2 LBS | RESPIRATION RATE: 14 BRPM | SYSTOLIC BLOOD PRESSURE: 136 MMHG | TEMPERATURE: 97.9 F | BODY MASS INDEX: 30.08 KG/M2

## 2021-10-26 DIAGNOSIS — L24.7 CONTACT DERMATITIS AND ECZEMA DUE TO PLANT: Primary | ICD-10-CM

## 2021-10-26 PROCEDURE — 99213 OFFICE O/P EST LOW 20 MIN: CPT | Performed by: FAMILY MEDICINE

## 2021-10-26 RX ORDER — FEXOFENADINE HCL 180 MG/1
180 TABLET ORAL DAILY
Qty: 30 TABLET | Refills: 0 | Status: SHIPPED | OUTPATIENT
Start: 2021-10-26

## 2021-10-26 ASSESSMENT — MIFFLIN-ST. JEOR: SCORE: 1587.91

## 2021-10-26 NOTE — PATIENT INSTRUCTIONS
"Take fexofenadine 180 mg orally once a night for up to 3-4 weeks.    May apply calamine lotion to the areas of rash as needed for itching    Apply Eucerine or CEtaphil creme moisturizer up to 4 x a day to prevent dry skin and to protect the skin.    Expect gradual improvement in the next 1-2 weeks. May wax and wane though over this time.    If not better after 10-14 days, contact the care team.  Patient Education     Contact Dermatitis  Contact dermatitis is a skin rash caused by something that touches the skin and makes it irritated and inflamed. Your skin may be red, swollen, dry, and may be cracked. Blisters may form and ooze. The rash will itch.   Contact dermatitis often forms on the face and neck, backs of hands, forearms, genitals, and lower legs. But it can affect any area.   People can get contact dermatitis from lots of sources. These include:    Plants such as poison ivy, oak, or sumac    Chemicals in hair dyes and rinses, soaps, solvents, waxes, fingernail polish, and deodorants     Jewelry or watchbands made of nickel or cobalt  Contact dermatitis is not passed from person to person.  Talk with your healthcare provider about what may have caused the rash. A type of allergy testing called \"patch testing\" may be used to discover what you are allergic to. You will need to stay away from the source of the rash in the future to prevent it from coming back.   Treatment is done to ease itching and prevent the rash from coming back. The rash should go away in a few days to a few weeks.   Home care  Your healthcare provider may prescribe medicine to ease swelling and itching. Follow all instructions when using these medicines.   General care    Stay away from anything that heats up your skin, such as hot showers or baths, or direct sunlight. This can make itching worse.    Apply cold compresses to soothe your sores to help ease your symptoms. Do this for 30 minutes 3 to 4 times a day. You can make a cold " compress by soaking a cloth in cold water. Squeeze out excess water. You can add colloidal oatmeal to the water to help reduce itching. For severe itching in a small area, apply an ice pack wrapped in a thin towel. Do this for 20 minutes 3 to 4 times a day.    You can also try wet dressings. One way to do this is to wear a wet piece of clothing under a dry one. Wear a damp shirt under a dry shirt if your upper body is affected. This can relieve itching and prevent you from scratching the affected area.    You can also help ease large areas of itching by taking a lukewarm bath with colloidal oatmeal added to the water.    Use hydrocortisone cream for redness and irritation, unless another medicine was prescribed. Calamine lotion can also relieve mild symptoms.    Use oral diphenhydramine to help reduce itching. You can buy this antihistamine at drugstores and grocery stores. It can make you sleepy, so use lower doses during the daytime. Don't use diphenhydramine if you have glaucoma or have trouble urinating because of an enlarged prostate.    If a plant causes your rash, make sure to wash your skin and the clothes you were wearing when you came into contact with the plant. This is to wash away the plant oils that gave you the rash and prevent more or worse symptoms. If you have a pet that's been outdoors, its fur may also have oil from the plant. Bathe your pet with soap or shampoo.    Stay away from the substance or object that causes your symptoms. If you can t stay away from it, wear gloves or some other type of protection    Follow-up care  Follow up with your healthcare provider, or as advised.  When to seek medical advice  Call your healthcare provider or seek medical attention right away if any of these occur:     Spreading of the rash to other parts of your body    Severe swelling of your face, eyelids, mouth, throat or tongue    Trouble urinating due to swelling in the genital area    Fever of 100.4 F  (38 C) or higher, or as advised by your provider    Redness or swelling that gets worse    Pain that gets worse    Foul-smelling fluid leaking from the skin    Yellow-brown crusts on the open blisters  [employer] last reviewed this educational content on 8/1/2019 2000-2021 [employer]. All rights reserved. This information is not intended as a substitute for professional medical care. Always follow your healthcare professional's instructions.

## 2021-10-26 NOTE — PROGRESS NOTES
"  Assessment & Plan     Contact dermatitis and eczema due to plant  Non-vesicular, non-bullous lesions.  Likely id reaction.  Discussed daily use of antihistamine. Patient concurred to plan.  Advised general skin care.  Consider referral to derm if worsening.  Return precautions discussed and given to patient.   - fexofenadine (ALLEGRA) 180 MG tablet  Dispense: 30 tablet; Refill: 0      Patient Instructions   Take fexofenadine 180 mg orally once a night for up to 3-4 weeks.    May apply calamine lotion to the areas of rash as needed for itching    Apply Eucerine or CEtaphil creme moisturizer up to 4 x a day to prevent dry skin and to protect the skin.    Expect gradual improvement in the next 1-2 weeks. May wax and wane though over this time.    If not better after 10-14 days, contact the care team.  Patient Education     Contact Dermatitis  Contact dermatitis is a skin rash caused by something that touches the skin and makes it irritated and inflamed. Your skin may be red, swollen, dry, and may be cracked. Blisters may form and ooze. The rash will itch.   Contact dermatitis often forms on the face and neck, backs of hands, forearms, genitals, and lower legs. But it can affect any area.   People can get contact dermatitis from lots of sources. These include:    Plants such as poison ivy, oak, or sumac    Chemicals in hair dyes and rinses, soaps, solvents, waxes, fingernail polish, and deodorants     Jewelry or watchbands made of nickel or cobalt  Contact dermatitis is not passed from person to person.  Talk with your healthcare provider about what may have caused the rash. A type of allergy testing called \"patch testing\" may be used to discover what you are allergic to. You will need to stay away from the source of the rash in the future to prevent it from coming back.   Treatment is done to ease itching and prevent the rash from coming back. The rash should go away in a few days to a few weeks.   Home care  Your " healthcare provider may prescribe medicine to ease swelling and itching. Follow all instructions when using these medicines.   General care    Stay away from anything that heats up your skin, such as hot showers or baths, or direct sunlight. This can make itching worse.    Apply cold compresses to soothe your sores to help ease your symptoms. Do this for 30 minutes 3 to 4 times a day. You can make a cold compress by soaking a cloth in cold water. Squeeze out excess water. You can add colloidal oatmeal to the water to help reduce itching. For severe itching in a small area, apply an ice pack wrapped in a thin towel. Do this for 20 minutes 3 to 4 times a day.    You can also try wet dressings. One way to do this is to wear a wet piece of clothing under a dry one. Wear a damp shirt under a dry shirt if your upper body is affected. This can relieve itching and prevent you from scratching the affected area.    You can also help ease large areas of itching by taking a lukewarm bath with colloidal oatmeal added to the water.    Use hydrocortisone cream for redness and irritation, unless another medicine was prescribed. Calamine lotion can also relieve mild symptoms.    Use oral diphenhydramine to help reduce itching. You can buy this antihistamine at drugstores and grocery stores. It can make you sleepy, so use lower doses during the daytime. Don't use diphenhydramine if you have glaucoma or have trouble urinating because of an enlarged prostate.    If a plant causes your rash, make sure to wash your skin and the clothes you were wearing when you came into contact with the plant. This is to wash away the plant oils that gave you the rash and prevent more or worse symptoms. If you have a pet that's been outdoors, its fur may also have oil from the plant. Bathe your pet with soap or shampoo.    Stay away from the substance or object that causes your symptoms. If you can t stay away from it, wear gloves or some other type of  protection    Follow-up care  Follow up with your healthcare provider, or as advised.  When to seek medical advice  Call your healthcare provider or seek medical attention right away if any of these occur:     Spreading of the rash to other parts of your body    Severe swelling of your face, eyelids, mouth, throat or tongue    Trouble urinating due to swelling in the genital area    Fever of 100.4 F (38 C) or higher, or as advised by your provider    Redness or swelling that gets worse    Pain that gets worse    Foul-smelling fluid leaking from the skin    Yellow-brown crusts on the open blisters  [employer] last reviewed this educational content on 8/1/2019 2000-2021 [employer]. All rights reserved. This information is not intended as a substitute for professional medical care. Always follow your healthcare professional's instructions.               Return in about 1 week (around 11/2/2021) for In-clinic visit for if worsening.    Cirilo Schwab MD  [facility] WENDI Plascencia is a 60 year old who presents for the following health issues   Chief Complaint   Patient presents with     Derm Problem     Pt here for rash all over his body.       HPI     Rash  Onset/Duration: 2 wks ago  Description  Location: all over  Character: blotchy, raised, draining, red  Itching: severe  Intensity:  severe  Progression of Symptoms:  Keeps breaking out in new spots and constant  Accompanying signs and symptoms:   Fever: no, some chills  Body aches or joint pain: no  Sore throat symptoms: no  Recent cold symptoms: no  History:           Previous episodes of similar rash: None  New exposures:  Out in the woods getting ready for hunting  Recent travel: no  Exposure to similar rash: no  Precipitating or alleviating factors: otc meds help some  Therapies tried and outcome: hydrocortisone cream -  effective, calamine lotion, Benadryl/diphenhydramine -  effective and allegra, help some  "with itching      Review of Systems   C: NEGATIVE for fever, chills, change in weight  INTEGUMENTARY/SKIN: see above  MUSCULOSKELETAL: see above  H: NEGATIVE for bleeding problems      Objective    /86   Pulse 67   Temp 97.9  F (36.6  C) (Tympanic)   Resp 14   Ht 1.67 m (5' 5.75\")   Wt 84.9 kg (187 lb 3.2 oz)   SpO2 98%   BMI 30.45 kg/m    Body mass index is 30.45 kg/m .  Physical Exam   GEN: alert, oriented x 3, NAD  SKIN: good turgor; on LUE - diffuse erythema of the skin with patchy wheals but no induration, vesicle, pustule, bulla or weeping lesions; scattered erythematou spatches with mild scaling on the RUE; diffuse erythematous papules and patches on torso; no rash on face.    No results found for any visits on 10/26/21.          "

## 2021-10-29 DIAGNOSIS — E89.0 POSTABLATIVE HYPOTHYROIDISM: ICD-10-CM

## 2021-10-29 RX ORDER — LEVOTHYROXINE SODIUM 75 UG/1
75 TABLET ORAL DAILY
Qty: 30 TABLET | Refills: 0 | Status: SHIPPED | OUTPATIENT
Start: 2021-10-29 | End: 2021-11-12

## 2021-11-10 ENCOUNTER — OFFICE VISIT (OUTPATIENT)
Dept: ENDOCRINOLOGY | Facility: CLINIC | Age: 61
End: 2021-11-10
Payer: COMMERCIAL

## 2021-11-10 VITALS
SYSTOLIC BLOOD PRESSURE: 166 MMHG | BODY MASS INDEX: 29.73 KG/M2 | HEIGHT: 66 IN | WEIGHT: 185 LBS | DIASTOLIC BLOOD PRESSURE: 98 MMHG

## 2021-11-10 DIAGNOSIS — E89.0 POSTABLATIVE HYPOTHYROIDISM: Primary | ICD-10-CM

## 2021-11-10 DIAGNOSIS — E05.00 GRAVES' EYE DISEASE: ICD-10-CM

## 2021-11-10 DIAGNOSIS — E05.00 GRAVES DISEASE: ICD-10-CM

## 2021-11-10 PROCEDURE — 99214 OFFICE O/P EST MOD 30 MIN: CPT | Performed by: STUDENT IN AN ORGANIZED HEALTH CARE EDUCATION/TRAINING PROGRAM

## 2021-11-10 ASSESSMENT — PAIN SCALES - GENERAL: PAINLEVEL: NO PAIN (0)

## 2021-11-10 ASSESSMENT — MIFFLIN-ST. JEOR: SCORE: 1581.9

## 2021-11-10 NOTE — PROGRESS NOTES
Endocrinology Clinic Visit 11/10/2021    NAME:  Temo Jalloh  PCP:  Eleonora Sherin Lacy  MRN:  [id number]  Requesting Provider:  Referred Self    Chief Complaint     Chief Complaint   Patient presents with     Thyroid Problem       History of Present Illness     Temo Jalloh is a 60 year old male who is seen in clinic for follow up of graves and hypothyroidism. Last seen by Dr. Teague 7/15/2020.    He was diagnosed with Graves' disease in 2013, and was treated with 22 mCi of radioactive iodine in August 2013, shortly after diagnosis.  He developed post ablative hypothyroidism and was started on levothyroxine, which she has been taking since at a dose of 75 mcg daily.       January 2018, his TSH was 7.87, with a normal free T4 of 0.9.  He felt euthyroid. LT4 increased dose to 88 mcg daily.  The patient reported palpitations on this higher dose and it was reduced back to 75 mcg.     Since then, he has had a couple of episodes of atrial tachycardia, for which he underwent a cardiac workup that was negative, and was advised to avoid caffeine and alcohol.  Since avoiding caffeine, the episodes have not recurred.     Also the patient has a history of Graves' ophthalmopathy which he had at the time of his original diagnosis. His ophthalmopathy presented as proptosis and diplopia.  He received steroid courses during his early diagnosis.  He was referred to ophthalmology (Gra[facility]) but never went there because he was always feeling better by the time the referral was entered.  he denied any new eye symptoms now. He denied any diplopia or worsening exophthalmos.  He has dry eyes for which he uses eye drops. He denied having any trouble closing his eyes completely.     Interval History:   Last visit 7/2020 was a virtual visit, he had labs done with TSH of 6.78 and FT4 of 1.1. he remains on LT4 at 75 mcg daily. He reported compliance, takes everyday on any empty stomach. No other meds or vitamins. He was  just started recently on Allegra fro poison Ivy rash, otherwise no new medical issues.   He feels fine today.  He has no complaints.  stable wt.  The patient denies any hyperthyroid or hypothyroid symptoms. He denied feeling cold, constipation, tremulousness, palpitations, sweating and hair loss.   Eye symptoms as above      Problem List     Patient Active Problem List   Diagnosis     Allergic rhinitis     LEFT AC ARTHROSIS     Sensorineural hearing loss, asymmetrical     CARDIOVASCULAR SCREENING; LDL GOAL LESS THAN 160     Hyperthyroidism     Graves' eye disease     Hypothyroidism, postablative        Medications     Current Outpatient Medications   Medication     fexofenadine (ALLEGRA) 180 MG tablet     levothyroxine (SYNTHROID/LEVOTHROID) 75 MCG tablet     No current facility-administered medications for this visit.        Allergies     No Known Allergies    Medical / Surgical History     Past Medical History:   Diagnosis Date     Allergic rhinitis, cause unspecified      Hyperthyroidism 5/23/2013     Injury, other and unspecified, shoulder and upper arm     Left     Other postablative hypothyroidism 12/7/2013     Plantar fascial fibromatosis      Venereal disease, unspecified     Venereal warts     No past surgical history on file.    Social History     Social History     Socioeconomic History     Marital status:      Spouse name: Not on file     Number of children: Not on file     Years of education: Not on file     Highest education level: Not on file   Occupational History     Not on file   Tobacco Use     Smoking status: Never Smoker     Smokeless tobacco: Never Used   Vaping Use     Vaping Use: Never used   Substance and Sexual Activity     Alcohol use: Yes     Comment: 2 times weekly     Drug use: No     Sexual activity: Not on file   Other Topics Concern     Parent/sibling w/ CABG, MI or angioplasty before 65F 55M? Yes     Comment: father has 5 stents; starting at age 55   Social History Narrative  "    Not on file     Social Determinants of Health     Financial Resource Strain: Not on file   Food Insecurity: Not on file   Transportation Needs: Not on file   Physical Activity: Not on file   Stress: Not on file   Social Connections: Not on file   Intimate Partner Violence: Not on file   Housing Stability: Not on file       Family History     Family History   Problem Relation Age of Onset     C.A.D. Father      Hypertension Father      Diabetes Father      Thyroid Disease Mother      Thyroid Disease Sister      Thyroid Disease Maternal Uncle      Thyroid Disease Other         two nieces     Breast Cancer No family hx of      Cancer - colorectal No family hx of      No thyroid disease  ROS     12 ROS completed, pertinent positive and negative in HPI    Physical Exam   BP (!) 166/98 (BP Location: Left arm, Patient Position: Sitting, Cuff Size: Adult Regular)   Ht 1.676 m (5' 6\")   Wt 83.9 kg (185 lb)   BMI 29.86 kg/m       General: Comfortable, no obvious distress, normal body habitus  Eyes: Sclera anicteric, moist conjunctiva  HENT: Atraumatic, oropharynx clear, moist mucous membranes with no mucosal ulcerations  Neck: Trachea midline, supple. No thyroid  CV: regular rate and rhythm .   Resp:  good effort, no evidence of loud wheezing  Abdomen:  obese, non distended.   Skin: No rashes, lesions, or subcutaneous nodules on exposed skin.   Psych: Alert and oriented x 3. Appropriate affect, good insight  Extremities: No peripheral edema  Musculoskeletal: Appropriate muscle bulk and strength  Neuro: Moves all four extremities. No focal deficits on limited exam. Gait normal.     Labs/Imaging     Pertinent Labs were reviewed and updated in [facility] and reviewed.  Radiology Results were  reviewed and updated in EPIC and reviewed.    Summary of recent findings:   Lab Results   Component Value Date    A1C 5.5 01/15/2016       TSH   Date Value Ref Range Status   07/23/2020 6.78 (H) 0.40 - 4.00 mU/L Final   03/28/2019 4.67 (H) " 0.40 - 4.00 mU/L Final   03/12/2018 4.69 (H) 0.40 - 4.00 mU/L Final   01/24/2018 7.87 (H) 0.40 - 4.00 mU/L Final   01/15/2016 2.92 0.40 - 4.00 mU/L Final     T4 Free   Date Value Ref Range Status   07/23/2020 1.10 0.76 - 1.46 ng/dL Final   03/28/2019 0.99 0.76 - 1.46 ng/dL Final   03/12/2018 1.01 0.76 - 1.46 ng/dL Final   01/24/2018 0.90 0.76 - 1.46 ng/dL Final   01/15/2016 1.05 0.76 - 1.46 ng/dL Final       Creatinine   Date Value Ref Range Status   09/15/2018 1.17 0.66 - 1.25 mg/dL Final       Recent Labs   Lab Test 03/12/18  1501   CHOL 184   HDL 54   *   TRIG 133       No results found for: [id number], [id number], [id number]    I personally reviewed the patient's outside records from [employer]. Summary of pertinent findings in HPI.    Impression / Plan     1. Graves disease  2. Postablative hypothyroidism  3. Graves ophthalmopathy     He is taking 75 mcg of levothyroxine.  clinically euthyroid. Last TFT 7/2020 with TSH of 7.8. he reported palpitation on LT4 88mcg in the past but he also says it might be from coffeine intake which he completely stopped. We will check TFt and decide on dose change if needed.     Plan: TSH with free T4 reflex  He does not need LT4 refill at this point.     Graves' eye disease  Comment: He has required 3 bursts of steroids since 2013, due to diplopia.  Has never been seen in the [facility]' eye clinic.  Is currently asymptomatic for the past 3-4 years.  He had multiple eye clinic referral but never went.            Test and/or medications prescribed today:  Orders Placed This Encounter   Procedures     TSH     T4 free         Follow up: 1 year      Jamal Barnett MD  Endocrinology, Diabetes and Metabolism  [facility]    30 minutes spent on the date of the encounter doing chart review, history and exam, documentation and further activities per the note

## 2021-11-10 NOTE — LETTER
11/10/2021       RE: Temo Jalloh  [address]  [facility] [address]     Dear Colleague,    Thank you for referring your patient, Temo Jalloh, to the [facility] ENDOCRINOLOGY CLINIC [place] at [facility]. Please see a copy of my visit note below.    Endocrinology Clinic Visit 11/10/2021    NAME:  Temo Jalloh  PCP:  Eleonora Sherinharsh [facility]  MRN:  [id number]  Requesting Provider:  Referred Self    Chief Complaint     Chief Complaint   Patient presents with     Thyroid Problem       History of Present Illness     Temo Jalloh is a 60 year old male who is seen in clinic for follow up of graves and hypothyroidism. Last seen by Dr. Teague 7/15/2020.    He was diagnosed with Graves' disease in 2013, and was treated with 22 mCi of radioactive iodine in August 2013, shortly after diagnosis.  He developed post ablative hypothyroidism and was started on levothyroxine, which she has been taking since at a dose of 75 mcg daily.       January 2018, his TSH was 7.87, with a normal free T4 of 0.9.  He felt euthyroid. LT4 increased dose to 88 mcg daily.  The patient reported palpitations on this higher dose and it was reduced back to 75 mcg.     Since then, he has had a couple of episodes of atrial tachycardia, for which he underwent a cardiac workup that was negative, and was advised to avoid caffeine and alcohol.  Since avoiding caffeine, the episodes have not recurred.     Also the patient has a history of Graves' ophthalmopathy which he had at the time of his original diagnosis. His ophthalmopathy presented as proptosis and diplopia.  He received steroid courses during his early diagnosis.  He was referred to ophthalmology (Gra[facility]' eye clinic) but never went there because he was always feeling better by the time the referral was entered.  he denied any new eye symptoms now. He denied any diplopia or worsening exophthalmos.  He has dry eyes for  which he uses eye drops. He denied having any trouble closing his eyes completely.     Interval History:   Last visit 7/2020 was a virtual visit, he had labs done with TSH of 6.78 and FT4 of 1.1. he remains on LT4 at 75 mcg daily. He reported compliance, takes everyday on any empty stomach. No other meds or vitamins. He was just started recently on Allegra fro poison Ivy rash, otherwise no new medical issues.   He feels fine today.  He has no complaints.  stable wt.  The patient denies any hyperthyroid or hypothyroid symptoms. He denied feeling cold, constipation, tremulousness, palpitations, sweating and hair loss.   Eye symptoms as above      Problem List     Patient Active Problem List   Diagnosis     Allergic rhinitis     LEFT AC ARTHROSIS     Sensorineural hearing loss, asymmetrical     CARDIOVASCULAR SCREENING; LDL GOAL LESS THAN 160     Hyperthyroidism     Graves' eye disease     Hypothyroidism, postablative        Medications     Current Outpatient Medications   Medication     fexofenadine (ALLEGRA) 180 MG tablet     levothyroxine (SYNTHROID/LEVOTHROID) 75 MCG tablet     No current facility-administered medications for this visit.        Allergies     No Known Allergies    Medical / Surgical History     Past Medical History:   Diagnosis Date     Allergic rhinitis, cause unspecified      Hyperthyroidism 5/23/2013     Injury, other and unspecified, shoulder and upper arm     Left     Other postablative hypothyroidism 12/7/2013     Plantar fascial fibromatosis      Venereal disease, unspecified     Venereal warts     No past surgical history on file.    Social History     Social History     Socioeconomic History     Marital status:      Spouse name: Not on file     Number of children: Not on file     Years of education: Not on file     Highest education level: Not on file   Occupational History     Not on file   Tobacco Use     Smoking status: Never Smoker     Smokeless tobacco: Never Used   Vaping Use  "    Vaping Use: Never used   Substance and Sexual Activity     Alcohol use: Yes     Comment: 2 times weekly     Drug use: No     Sexual activity: Not on file   Other Topics Concern     Parent/sibling w/ CABG, MI or angioplasty before 65F 55M? Yes     Comment: father has 5 stents; starting at age 55   Social History Narrative     Not on file     Social Determinants of Health     Financial Resource Strain: Not on file   Food Insecurity: Not on file   Transportation Needs: Not on file   Physical Activity: Not on file   Stress: Not on file   Social Connections: Not on file   Intimate Partner Violence: Not on file   Housing Stability: Not on file       Family History     Family History   Problem Relation Age of Onset     C.A.D. Father      Hypertension Father      Diabetes Father      Thyroid Disease Mother      Thyroid Disease Sister      Thyroid Disease Maternal Uncle      Thyroid Disease Other         two nieces     Breast Cancer No family hx of      Cancer - colorectal No family hx of      No thyroid disease  ROS     12 ROS completed, pertinent positive and negative in HPI    Physical Exam   BP (!) 166/98 (BP Location: Left arm, Patient Position: Sitting, Cuff Size: Adult Regular)   Ht 1.676 m (5' 6\")   Wt 83.9 kg (185 lb)   BMI 29.86 kg/m       General: Comfortable, no obvious distress, normal body habitus  Eyes: Sclera anicteric, moist conjunctiva  HENT: Atraumatic, oropharynx clear, moist mucous membranes with no mucosal ulcerations  Neck: Trachea midline, supple. No thyroid  CV: regular rate and rhythm .   Resp:  good effort, no evidence of loud wheezing  Abdomen:  obese, non distended.   Skin: No rashes, lesions, or subcutaneous nodules on exposed skin.   Psych: Alert and oriented x 3. Appropriate affect, good insight  Extremities: No peripheral edema  Musculoskeletal: Appropriate muscle bulk and strength  Neuro: Moves all four extremities. No focal deficits on limited exam. Gait normal.     Labs/Imaging "     Pertinent Labs were reviewed and updated in [facility] and reviewed.  Radiology Results were  reviewed and updated in EPIC and reviewed.    Summary of recent findings:   Lab Results   Component Value Date    A1C 5.5 01/15/2016       TSH   Date Value Ref Range Status   07/23/2020 6.78 (H) 0.40 - 4.00 mU/L Final   03/28/2019 4.67 (H) 0.40 - 4.00 mU/L Final   03/12/2018 4.69 (H) 0.40 - 4.00 mU/L Final   01/24/2018 7.87 (H) 0.40 - 4.00 mU/L Final   01/15/2016 2.92 0.40 - 4.00 mU/L Final     T4 Free   Date Value Ref Range Status   07/23/2020 1.10 0.76 - 1.46 ng/dL Final   03/28/2019 0.99 0.76 - 1.46 ng/dL Final   03/12/2018 1.01 0.76 - 1.46 ng/dL Final   01/24/2018 0.90 0.76 - 1.46 ng/dL Final   01/15/2016 1.05 0.76 - 1.46 ng/dL Final       Creatinine   Date Value Ref Range Status   09/15/2018 1.17 0.66 - 1.25 mg/dL Final       Recent Labs   Lab Test 03/12/18  1501   CHOL 184   HDL 54   *   TRIG 133       No results found for: [id number], [id number], [id number]    I personally reviewed the patient's outside records from [facility] EMR. Summary of pertinent findings in HPI.    Impression / Plan     1. Graves disease  2. Postablative hypothyroidism  3. Graves ophthalmopathy     He is taking 75 mcg of levothyroxine.  clinically euthyroid. Last TFT 7/2020 with TSH of 7.8. he reported palpitation on LT4 88mcg in the past but he also says it might be from coffeine intake which he completely stopped. We will check TFt and decide on dose change if needed.     Plan: TSH with free T4 reflex  He does not need LT4 refill at this point.     Graves' eye disease  Comment: He has required 3 bursts of steroids since 2013, due to diplopia.  Has never been seen in the Graves' eye clinic.  Is currently asymptomatic for the past 3-4 years.  He had multiple eye clinic referral but never went.            Test and/or medications prescribed today:  Orders Placed This Encounter   Procedures     TSH     T4 free     Follow up: 1 year      30  minutes spent on the date of the encounter doing chart review, history and exam, documentation and further activities per the note            Again, thank you for allowing me to participate in the care of your patient.      Sincerely,    Jamal Barnett MD

## 2021-11-10 NOTE — NURSING NOTE
"Chief Complaint   Patient presents with     Thyroid Problem     Vital signs:      BP: (!) 166/98             Height: 167.6 cm (5' 6\") Weight: 83.9 kg (185 lb)  Estimated body mass index is 29.86 kg/m  as calculated from the following:    Height as of this encounter: 1.676 m (5' 6\").    Weight as of this encounter: 83.9 kg (185 lb).  Tania Piña, EMT    "

## 2021-11-10 NOTE — PATIENT INSTRUCTIONS
It was great meeting you today!    We need thyroid labs, you can do them in any [place] lab.    Follow up in 1 year

## 2021-11-11 ENCOUNTER — LAB (OUTPATIENT)
Dept: LAB | Facility: CLINIC | Age: 61
End: 2021-11-11
Payer: COMMERCIAL

## 2021-11-11 DIAGNOSIS — E05.00 GRAVES' EYE DISEASE: ICD-10-CM

## 2021-11-11 DIAGNOSIS — E89.0 POSTABLATIVE HYPOTHYROIDISM: ICD-10-CM

## 2021-11-11 LAB
T4 FREE SERPL-MCNC: 0.89 NG/DL (ref 0.76–1.46)
TSH SERPL DL<=0.005 MIU/L-ACNC: 18.25 MU/L (ref 0.4–4)

## 2021-11-11 PROCEDURE — 84443 ASSAY THYROID STIM HORMONE: CPT

## 2021-11-11 PROCEDURE — 36415 COLL VENOUS BLD VENIPUNCTURE: CPT

## 2021-11-11 PROCEDURE — 84439 ASSAY OF FREE THYROXINE: CPT

## 2021-11-12 ENCOUNTER — TELEPHONE (OUTPATIENT)
Dept: ENDOCRINOLOGY | Facility: CLINIC | Age: 61
End: 2021-11-12
Payer: COMMERCIAL

## 2021-11-12 DIAGNOSIS — E89.0 POSTABLATIVE HYPOTHYROIDISM: Primary | ICD-10-CM

## 2021-11-12 RX ORDER — LEVOTHYROXINE SODIUM 88 UG/1
88 TABLET ORAL DAILY
Qty: 90 TABLET | Refills: 1 | Status: SHIPPED | OUTPATIENT
Start: 2021-11-12 | End: 2022-04-27

## 2021-11-12 NOTE — TELEPHONE ENCOUNTER
I called patient to inform him of his thyroid labs. TSH elevated at 18, no answer, I left . I would like to increase his LT4 to 88 mcg daily, repeat thyroid labs in 2 month.    Thank you!    Jamal Barnett MD  Endocrinology, Diabetes and Metabolism  [facility]

## 2021-11-13 ENCOUNTER — HEALTH MAINTENANCE LETTER (OUTPATIENT)
Age: 61
End: 2021-11-13

## 2021-11-16 NOTE — TELEPHONE ENCOUNTER
Jamal Barnett MD  Clinic [id number] 4 days ago       Can you please confirm he got my message? Thank you/!    Routing comment

## 2022-04-25 DIAGNOSIS — E89.0 POSTABLATIVE HYPOTHYROIDISM: ICD-10-CM

## 2022-04-27 NOTE — TELEPHONE ENCOUNTER
levothyroxine (SYNTHROID/LEVOTHROID) 88 MCG tablet  Last Written Prescription Date:   11/12/2021  Last Fill Quantity: 90,   # refills: 1  Last Office Visit :  11/10/2021  Future Office visit:   11/14/2022    Routing refill request to provider for review/approval because:  Abnormal TSH  Refer to clinic for review     Recent Labs   Lab Test 11/11/21  1558   TSH 18.25*       Antionette Rodriguez RN  Central Triage Red Flags/Med Refills

## 2022-04-29 RX ORDER — LEVOTHYROXINE SODIUM 88 UG/1
88 TABLET ORAL DAILY
Qty: 90 TABLET | Refills: 3 | Status: SHIPPED | OUTPATIENT
Start: 2022-04-29 | End: 2022-11-18

## 2022-11-14 ENCOUNTER — VIRTUAL VISIT (OUTPATIENT)
Dept: ENDOCRINOLOGY | Facility: CLINIC | Age: 62
End: 2022-11-14
Payer: COMMERCIAL

## 2022-11-14 DIAGNOSIS — E89.0 POSTABLATIVE HYPOTHYROIDISM: Primary | ICD-10-CM

## 2022-11-14 PROCEDURE — 99213 OFFICE O/P EST LOW 20 MIN: CPT | Mod: 95 | Performed by: STUDENT IN AN ORGANIZED HEALTH CARE EDUCATION/TRAINING PROGRAM

## 2022-11-14 NOTE — PROGRESS NOTES
Endocrinology Clinic Visit     Telephone-Visit Details           Video Start Time: 2:36pm  Video End Time: 2:46 pm     I spent a total of 20 minutes on the date of encounter reviewing medical records, evaluating the patient, coordinating care and documenting in the EHR, as detailed above.         NAME:  Temo Jalloh  PCP:  Eleonora Sherin Lacy  MRN:  [id number]  Requesting Provider:  Referred Self    Chief Complaint     Chief Complaint   Patient presents with     Follow Up       History of Present Illness     Temo Jalloh is a 60 year old male who is seen in clinic for follow up of graves and hypothyroidism. Last seen 11/2021.    He was diagnosed with Graves' disease in 2013, and was treated with 22 mCi of radioactive iodine in August 2013, shortly after diagnosis.  He developed post ablative hypothyroidism and was started on levothyroxine.      Interval hx: last visit his TFT 11/2021 TSH 18.25 and FT4 0.89 while on LT4 75 mcg daily. I increased it to 88 mcg daily. He did not repeat his labs since then. He feels well and has no new symptoms or complaints. He denied hypo or hyperthyroid sx, wt fluctuates only by few lbs.          Also the patient has a history of Graves' ophthalmopathy which he had at the time of his original diagnosis. His ophthalmopathy presented as proptosis and diplopia.  He received steroid courses during his early diagnosis.  He was referred to ophthalmology (Graves' eye clinic) but never went there because he was always feeling better by the time the referral was entered.      Today againhe denied any new eye symptoms now. He denied any diplopia or worsening exophthalmos.  He has dry eyes for which he uses eye drops.           Problem List     Patient Active Problem List   Diagnosis     Allergic rhinitis     LEFT AC ARTHROSIS     Sensorineural hearing loss, asymmetrical     CARDIOVASCULAR SCREENING; LDL GOAL LESS THAN 160     Hyperthyroidism     Graves' eye disease      Hypothyroidism, postablative        Medications     Current Outpatient Medications   Medication     fexofenadine (ALLEGRA) 180 MG tablet     levothyroxine (SYNTHROID/LEVOTHROID) 88 MCG tablet     No current facility-administered medications for this visit.        Allergies     No Known Allergies    Medical / Surgical History     Past Medical History:   Diagnosis Date     Allergic rhinitis, cause unspecified      Hyperthyroidism 5/23/2013     Injury, other and unspecified, shoulder and upper arm     Left     Other postablative hypothyroidism 12/7/2013     Plantar fascial fibromatosis      Venereal disease, unspecified     Venereal warts     No past surgical history on file.    Social History     Social History     Socioeconomic History     Marital status:      Spouse name: Not on file     Number of children: Not on file     Years of education: Not on file     Highest education level: Not on file   Occupational History     Not on file   Tobacco Use     Smoking status: Never     Smokeless tobacco: Never   Vaping Use     Vaping Use: Never used   Substance and Sexual Activity     Alcohol use: Yes     Comment: 2 times weekly     Drug use: No     Sexual activity: Not on file   Other Topics Concern     Parent/sibling w/ CABG, MI or angioplasty before 65F 55M? Yes     Comment: father has 5 stents; starting at age 55   Social History Narrative     Not on file     Social Determinants of Health     Financial Resource Strain: Not on file   Food Insecurity: Not on file   Transportation Needs: Not on file   Physical Activity: Not on file   Stress: Not on file   Social Connections: Not on file   Intimate Partner Violence: Not on file   Housing Stability: Not on file       Family History     Family History   Problem Relation Age of Onset     C.A.D. Father      Hypertension Father      Diabetes Father      Thyroid Disease Mother      Thyroid Disease Sister      Thyroid Disease Maternal Uncle      Thyroid Disease Other          two nieces     Breast Cancer No family hx of      Cancer - colorectal No family hx of      No thyroid disease  ROS     Limited ROS completed, pertinent positive and negative in HPI    Physical Exam   There were no vitals taken for this visit.       Labs/Imaging     Pertinent Labs were reviewed and updated in [facility] and reviewed.  Radiology Results were  reviewed and updated in EPIC and reviewed.    Summary of recent findings:   Lab Results   Component Value Date    A1C 5.5 01/15/2016       TSH   Date Value Ref Range Status   11/11/2021 18.25 (H) 0.40 - 4.00 mU/L Final   07/23/2020 6.78 (H) 0.40 - 4.00 mU/L Final   03/28/2019 4.67 (H) 0.40 - 4.00 mU/L Final   03/12/2018 4.69 (H) 0.40 - 4.00 mU/L Final   01/24/2018 7.87 (H) 0.40 - 4.00 mU/L Final   01/15/2016 2.92 0.40 - 4.00 mU/L Final     T4 Free   Date Value Ref Range Status   07/23/2020 1.10 0.76 - 1.46 ng/dL Final   03/28/2019 0.99 0.76 - 1.46 ng/dL Final   03/12/2018 1.01 0.76 - 1.46 ng/dL Final   01/24/2018 0.90 0.76 - 1.46 ng/dL Final   01/15/2016 1.05 0.76 - 1.46 ng/dL Final     Free T4   Date Value Ref Range Status   11/11/2021 0.89 0.76 - 1.46 ng/dL Final       Creatinine   Date Value Ref Range Status   09/15/2018 1.17 0.66 - 1.25 mg/dL Final       Recent Labs   Lab Test 03/12/18  1501   CHOL 184   HDL 54   *   TRIG 133       No results found for: [id number], [id number], [id number]    I personally reviewed the patient's outside records from [facility] EMR. Summary of pertinent findings in HPI.    Impression / Plan     1. Graves disease  2. Postablative hypothyroidism  3. Graves ophthalmopathy     He is taking 88 mcg of levothyroxine.  clinically euthyroid. Last TFT 11/2021. Due for labs    Plan: TSH with free T4 reflex  Will adjust lt4 accordingly  He does not use mychart and would like to be called with results.       # Graves' eye disease  Has never been seen in the Graves' eye clinic.  No new symptoms for the past 4 years.  He had multiple eye clinic  referral but never went.            Test and/or medications prescribed today:  Orders Placed This Encounter   Procedures     TSH with free T4 reflex         Follow up: 1 year      Jamal Barnett MD  Endocrinology, Diabetes and Metabolism  [facility]

## 2022-11-14 NOTE — LETTER
11/14/2022       RE: Temo Jalloh  [address]  [facility] [address]     Dear Colleague,    Thank you for referring your patient, Temo Jalloh, to the [facility] ENDOCRINOLOGY CLINIC [place] at [facility]. Please see a copy of my visit note below.    Endocrinology Clinic Visit     Telephone-Visit Details           Video Start Time: 2:36pm  Video End Time: 2:46 pm     I spent a total of 20 minutes on the date of encounter reviewing medical records, evaluating the patient, coordinating care and documenting in the EHR, as detailed above.         NAME:  Temo Jalloh  PCP:  Sherin Crews  MRN:  [id number]  Requesting Provider:  Referred Self    Chief Complaint     Chief Complaint   Patient presents with     Follow Up       History of Present Illness     Temo Jalloh is a 60 year old male who is seen in clinic for follow up of graves and hypothyroidism. Last seen 11/2021.    He was diagnosed with Graves' disease in 2013, and was treated with 22 mCi of radioactive iodine in August 2013, shortly after diagnosis.  He developed post ablative hypothyroidism and was started on levothyroxine.      Interval hx: last visit his TFT 11/2021 TSH 18.25 and FT4 0.89 while on LT4 75 mcg daily. I increased it to 88 mcg daily. He did not repeat his labs since then. He feels well and has no new symptoms or complaints. He denied hypo or hyperthyroid sx, wt fluctuates only by few lbs.          Also the patient has a history of Graves' ophthalmopathy which he had at the time of his original diagnosis. His ophthalmopathy presented as proptosis and diplopia.  He received steroid courses during his early diagnosis.  He was referred to ophthalmology (Graves' eye clinic) but never went there because he was always feeling better by the time the referral was entered.      Today againhe denied any new eye symptoms now. He denied any diplopia or worsening  exophthalmos.  He has dry eyes for which he uses eye drops.           Problem List     Patient Active Problem List   Diagnosis     Allergic rhinitis     LEFT AC ARTHROSIS     Sensorineural hearing loss, asymmetrical     CARDIOVASCULAR SCREENING; LDL GOAL LESS THAN 160     Hyperthyroidism     Graves' eye disease     Hypothyroidism, postablative        Medications     Current Outpatient Medications   Medication     fexofenadine (ALLEGRA) 180 MG tablet     levothyroxine (SYNTHROID/LEVOTHROID) 88 MCG tablet     No current facility-administered medications for this visit.        Allergies     No Known Allergies    Medical / Surgical History     Past Medical History:   Diagnosis Date     Allergic rhinitis, cause unspecified      Hyperthyroidism 5/23/2013     Injury, other and unspecified, shoulder and upper arm     Left     Other postablative hypothyroidism 12/7/2013     Plantar fascial fibromatosis      Venereal disease, unspecified     Venereal warts     No past surgical history on file.    Social History     Social History     Socioeconomic History     Marital status:      Spouse name: Not on file     Number of children: Not on file     Years of education: Not on file     Highest education level: Not on file   Occupational History     Not on file   Tobacco Use     Smoking status: Never     Smokeless tobacco: Never   Vaping Use     Vaping Use: Never used   Substance and Sexual Activity     Alcohol use: Yes     Comment: 2 times weekly     Drug use: No     Sexual activity: Not on file   Other Topics Concern     Parent/sibling w/ CABG, MI or angioplasty before 65F 55M? Yes     Comment: father has 5 stents; starting at age 55   Social History Narrative     Not on file     Social Determinants of Health     Financial Resource Strain: Not on file   Food Insecurity: Not on file   Transportation Needs: Not on file   Physical Activity: Not on file   Stress: Not on file   Social Connections: Not on file   Intimate Partner  Violence: Not on file   Housing Stability: Not on file       Family History     Family History   Problem Relation Age of Onset     C.A.D. Father      Hypertension Father      Diabetes Father      Thyroid Disease Mother      Thyroid Disease Sister      Thyroid Disease Maternal Uncle      Thyroid Disease Other         two nieces     Breast Cancer No family hx of      Cancer - colorectal No family hx of      No thyroid disease  ROS     Limited ROS completed, pertinent positive and negative in HPI    Physical Exam   There were no vitals taken for this visit.       Labs/Imaging     Pertinent Labs were reviewed and updated in [facility] and reviewed.  Radiology Results were  reviewed and updated in EPIC and reviewed.    Summary of recent findings:   Lab Results   Component Value Date    A1C 5.5 01/15/2016       TSH   Date Value Ref Range Status   11/11/2021 18.25 (H) 0.40 - 4.00 mU/L Final   07/23/2020 6.78 (H) 0.40 - 4.00 mU/L Final   03/28/2019 4.67 (H) 0.40 - 4.00 mU/L Final   03/12/2018 4.69 (H) 0.40 - 4.00 mU/L Final   01/24/2018 7.87 (H) 0.40 - 4.00 mU/L Final   01/15/2016 2.92 0.40 - 4.00 mU/L Final     T4 Free   Date Value Ref Range Status   07/23/2020 1.10 0.76 - 1.46 ng/dL Final   03/28/2019 0.99 0.76 - 1.46 ng/dL Final   03/12/2018 1.01 0.76 - 1.46 ng/dL Final   01/24/2018 0.90 0.76 - 1.46 ng/dL Final   01/15/2016 1.05 0.76 - 1.46 ng/dL Final     Free T4   Date Value Ref Range Status   11/11/2021 0.89 0.76 - 1.46 ng/dL Final       Creatinine   Date Value Ref Range Status   09/15/2018 1.17 0.66 - 1.25 mg/dL Final       Recent Labs   Lab Test 03/12/18  1501   CHOL 184   HDL 54   *   TRIG 133       No results found for: [id number], [id number], [id number]    I personally reviewed the patient's outside records from [facility] EMR. Summary of pertinent findings in HPI.    Impression / Plan     1. Graves disease  2. Postablative hypothyroidism  3. Graves ophthalmopathy     He is taking 88 mcg of levothyroxine.  clinically  euthyroid. Last TFT 11/2021. Due for labs    Plan: TSH with free T4 reflex  Will adjust lt4 accordingly  He does not use mychart and would like to be called with results.       # Graves' eye disease  Has never been seen in the Graves' eye clinic.  No new symptoms for the past 4 years.  He had multiple eye clinic referral but never went.            Test and/or medications prescribed today:  Orders Placed This Encounter   Procedures     TSH with free T4 reflex         Follow up: 1 year      Jamal Barnett MD  Endocrinology, Diabetes and Metabolism  [facility]      Temo is a 61 year old who is being evaluated via a billable telephone visit.      What phone number would you like to be contacted at? [phone]   How would you like to obtain your AVS? Samantha

## 2022-11-14 NOTE — PROGRESS NOTES
Temo is a 61 year old who is being evaluated via a billable telephone visit.      What phone number would you like to be contacted at? [phone]   How would you like to obtain your AVS? MyChart

## 2022-11-16 ENCOUNTER — TELEPHONE (OUTPATIENT)
Dept: ENDOCRINOLOGY | Facility: CLINIC | Age: 62
End: 2022-11-16

## 2022-11-16 NOTE — TELEPHONE ENCOUNTER
Attempted to reach patient to schedule follow up in the Endocrinology Clinic. No answer, LM on VM to call office back.    Schedule with Jamal Barnett MD.     Mary Brantley, VF

## 2022-11-18 ENCOUNTER — LAB (OUTPATIENT)
Dept: LAB | Facility: CLINIC | Age: 62
End: 2022-11-18
Payer: COMMERCIAL

## 2022-11-18 DIAGNOSIS — E89.0 POSTABLATIVE HYPOTHYROIDISM: ICD-10-CM

## 2022-11-18 LAB
T4 FREE SERPL-MCNC: 1.14 NG/DL (ref 0.9–1.7)
TSH SERPL DL<=0.005 MIU/L-ACNC: 9.58 UIU/ML (ref 0.3–4.2)

## 2022-11-18 PROCEDURE — 36415 COLL VENOUS BLD VENIPUNCTURE: CPT

## 2022-11-18 PROCEDURE — 84439 ASSAY OF FREE THYROXINE: CPT

## 2022-11-18 PROCEDURE — 84443 ASSAY THYROID STIM HORMONE: CPT

## 2022-11-18 RX ORDER — LEVOTHYROXINE SODIUM 112 UG/1
112 TABLET ORAL DAILY
Qty: 90 TABLET | Refills: 3 | Status: SHIPPED | OUTPATIENT
Start: 2022-11-18 | End: 2023-10-20

## 2022-11-19 ENCOUNTER — HEALTH MAINTENANCE LETTER (OUTPATIENT)
Age: 62
End: 2022-11-19

## 2023-09-20 DIAGNOSIS — E89.0 POSTABLATIVE HYPOTHYROIDISM: ICD-10-CM

## 2023-09-20 RX ORDER — LEVOTHYROXINE SODIUM 112 UG/1
112 TABLET ORAL DAILY
Qty: 90 TABLET | Refills: 3 | OUTPATIENT
Start: 2023-09-20

## 2023-10-20 DIAGNOSIS — E89.0 POSTABLATIVE HYPOTHYROIDISM: ICD-10-CM

## 2023-10-20 RX ORDER — LEVOTHYROXINE SODIUM 112 UG/1
112 TABLET ORAL DAILY
Qty: 90 TABLET | Refills: 3 | Status: SHIPPED | OUTPATIENT
Start: 2023-10-20 | End: 2023-11-21

## 2023-10-20 NOTE — TELEPHONE ENCOUNTER
levothyroxine (SYNTHROID/LEVOTHROID) 112 MCG tablet     90 tablet 3 11/18/2022 11/14/2022  [facility] Endocrinology Clinic [place]      Jamal Barnett MD  Endocrinology, Diabetes, and Metabolism    Routed because: tsh abnormal. Lab ordered. Not drawn.

## 2023-11-06 ENCOUNTER — VIRTUAL VISIT (OUTPATIENT)
Dept: ENDOCRINOLOGY | Facility: CLINIC | Age: 63
End: 2023-11-06
Payer: COMMERCIAL

## 2023-11-06 DIAGNOSIS — E89.0 POSTABLATIVE HYPOTHYROIDISM: ICD-10-CM

## 2023-11-06 DIAGNOSIS — E05.00 GRAVES DISEASE: ICD-10-CM

## 2023-11-06 DIAGNOSIS — E05.00 GRAVES' EYE DISEASE: Primary | ICD-10-CM

## 2023-11-06 PROCEDURE — 99441 PR PHYSICIAN TELEPHONE EVALUATION 5-10 MIN: CPT | Performed by: STUDENT IN AN ORGANIZED HEALTH CARE EDUCATION/TRAINING PROGRAM

## 2023-11-06 NOTE — PROGRESS NOTES
Endocrinology Clinic Visit     Telephone-Visit Details           Time spent on the phone 8 minutes. I spent a total of 20 minutes on the date of encounter reviewing medical records, evaluating the patient, coordinating care and documenting in the EHR, as detailed above.         NAME:  Temo Jalloh  PCP:  Fredyira Sherin Lacy  MRN:  [id number]  Requesting Provider:  Referred Self    Chief Complaint     Chief Complaint   Patient presents with    RECHECK       History of Present Illness     Temo Jalloh is a 60 year old male who is having a visit for follow up of graves and hypothyroidism. Last phone visit 11/2022.    He was diagnosed with Graves' disease in 2013, and was treated with 22 mCi of radioactive iodine in August 2013, shortly after diagnosis.  He developed post ablative hypothyroidism and was started on levothyroxine.      his TFT 11/2021 TSH 18.25 and FT4 0.89 while on LT4 75 mcg daily. I increased it to 88 mcg daily. On 11/2022 TSH 9.5, lt4 increased to 112 mcg daily. He does not take any other meds. He reported no new sx or concerns. Currently takes levothyroxine on empty stomach and waits at least 30 minutes before eating.  Does not take calcium or iron containing multivitamins within 4 hours of taking the levothyroxine tablet.  No known celiac disease.         Also the patient has a history of Graves' ophthalmopathy which he had at the time of his original diagnosis. His ophthalmopathy presented as proptosis and diplopia.  He received steroid courses during his early diagnosis.  He was referred to ophthalmology ([facility]) but never went there because he was always feeling better by the time the referral was entered.      Today again he denied any new eye symptoms now. He denied any diplopia or worsening exophthalmos.           Problem List     Patient Active Problem List   Diagnosis    Allergic rhinitis    LEFT AC ARTHROSIS    Sensorineural hearing loss, asymmetrical     CARDIOVASCULAR SCREENING; LDL GOAL LESS THAN 160    Hyperthyroidism    Graves' eye disease    Hypothyroidism, postablative        Medications     Current Outpatient Medications   Medication    fexofenadine (ALLEGRA) 180 MG tablet    levothyroxine (SYNTHROID/LEVOTHROID) 112 MCG tablet     No current facility-administered medications for this visit.        Allergies     No Known Allergies    Medical / Surgical History     Past Medical History:   Diagnosis Date    Allergic rhinitis, cause unspecified     Hyperthyroidism 5/23/2013    Injury, other and unspecified, shoulder and upper arm     Left    Other postablative hypothyroidism 12/7/2013    Plantar fascial fibromatosis     Venereal disease, unspecified     Venereal warts     No past surgical history on file.    Social History     Social History     Socioeconomic History    Marital status:      Spouse name: Not on file    Number of children: Not on file    Years of education: Not on file    Highest education level: Not on file   Occupational History    Not on file   Tobacco Use    Smoking status: Never    Smokeless tobacco: Never   Vaping Use    Vaping Use: Never used   Substance and Sexual Activity    Alcohol use: Yes     Comment: 2 times weekly    Drug use: No    Sexual activity: Not on file   Other Topics Concern    Parent/sibling w/ CABG, MI or angioplasty before 65F 55M? Yes     Comment: father has 5 stents; starting at age 55   Social History Narrative    Not on file     Social Determinants of Health     Financial Resource Strain: Not on file   Food Insecurity: Not on file   Transportation Needs: Not on file   Physical Activity: Not on file   Stress: Not on file   Social Connections: Not on file   Interpersonal Safety: Not on file   Housing Stability: Not on file       Family History     Family History   Problem Relation Age of Onset    C.A.D. Father     Hypertension Father     Diabetes Father     Thyroid Disease Mother     Thyroid Disease Sister      "Thyroid Disease Maternal Uncle     Thyroid Disease Other         two nieces    Breast Cancer No family hx of     Cancer - colorectal No family hx of      No thyroid disease  ROS     Limited ROS completed, pertinent positive and negative in HPI    Physical Exam   There were no vitals taken for this visit.       Labs/Imaging     Pertinent Labs were reviewed and updated in [facility] and reviewed.  Radiology Results were  reviewed and updated in EPIC and reviewed.    Summary of recent findings:   Lab Results   Component Value Date    A1C 5.5 01/15/2016       TSH   Date Value Ref Range Status   11/18/2022 9.58 (H) 0.30 - 4.20 uIU/mL Final   11/11/2021 18.25 (H) 0.40 - 4.00 mU/L Final   07/23/2020 6.78 (H) 0.40 - 4.00 mU/L Final   03/28/2019 4.67 (H) 0.40 - 4.00 mU/L Final   03/12/2018 4.69 (H) 0.40 - 4.00 mU/L Final   01/24/2018 7.87 (H) 0.40 - 4.00 mU/L Final   01/15/2016 2.92 0.40 - 4.00 mU/L Final     T4 Free   Date Value Ref Range Status   07/23/2020 1.10 0.76 - 1.46 ng/dL Final   03/28/2019 0.99 0.76 - 1.46 ng/dL Final   03/12/2018 1.01 0.76 - 1.46 ng/dL Final   01/24/2018 0.90 0.76 - 1.46 ng/dL Final   01/15/2016 1.05 0.76 - 1.46 ng/dL Final     Free T4   Date Value Ref Range Status   11/18/2022 1.14 0.90 - 1.70 ng/dL Final   11/11/2021 0.89 0.76 - 1.46 ng/dL Final       Creatinine   Date Value Ref Range Status   09/15/2018 1.17 0.66 - 1.25 mg/dL Final       Recent Labs   Lab Test 03/12/18  1501   CHOL 184   HDL 54   *   TRIG 133       No results found for: \"[id number]\", \"[id number]\", \"[id number]\"    I personally reviewed the patient's outside records from [facility] EMR. Summary of pertinent findings in HPI.    Impression / Plan     1. Graves disease  2. Postablative hypothyroidism  3. Graves ophthalmopathy     He is taking 112 mcg of levothyroxine.  clinically euthyroid. Last TFT 11/2022. Due for labs    Plan: TSH with free T4 reflex  Will adjust lt4 accordingly  He does not use mychart and would like to be called " with results.       # Graves' eye disease  Has never been seen in the [facility] clinic. No sx concerning for active inflammation.  No new symptoms for the past 5 years.  He had multiple eye clinic referral but never went.            Test and/or medications prescribed today:  No orders of the defined types were placed in this encounter.        Follow up: 1 year      Jamal Barnett MD  Endocrinology, Diabetes and Metabolism  [facility]

## 2023-11-06 NOTE — NURSING NOTE
Is the patient currently in the state of [place]? Please verify at call to make sure    Visit mode:TELEPHONE    If the visit is dropped, the patient can be reconnected by: Telephone- [phone]    Will anyone else be joining the visit? NO  (If patient encounters technical issues they should call [phone]    How would you like to obtain your AVS? MyChart    Are changes needed to the allergy or medication list? No    Reason for visit: RECHECK    Juany DAVIDSON

## 2023-11-06 NOTE — LETTER
11/6/2023       RE: Temo Jalloh  [address]  [place] [address]     Dear Colleague,    Thank you for referring your patient, Temo Jalloh, to the [facility] [place] [facility]. Please see a copy of my visit note below.    Endocrinology Clinic Visit     Telephone-Visit Details           Time spent on the phone 8 minutes. I spent a total of 20 minutes on the date of encounter reviewing medical records, evaluating the patient, coordinating care and documenting in the EHR, as detailed above.         NAME:  Temo Jalloh  PCP:  Sherin Crews  MRN:  [id number]  Requesting Provider:  Referred Self    Chief Complaint     Chief Complaint   Patient presents with    RECHECK       History of Present Illness     Temo Jalloh is a 60 year old male who is having a visit for follow up of graves and hypothyroidism. Last phone visit 11/2022.    He was diagnosed with Graves' disease in 2013, and was treated with 22 mCi of radioactive iodine in August 2013, shortly after diagnosis.  He developed post ablative hypothyroidism and was started on levothyroxine.      his TFT 11/2021 TSH 18.25 and FT4 0.89 while on LT4 75 mcg daily. I increased it to 88 mcg daily. On 11/2022 TSH 9.5, lt4 increased to 112 mcg daily. He does not take any other meds. He reported no new sx or concerns. Currently takes levothyroxine on empty stomach and waits at least 30 minutes before eating.  Does not take calcium or iron containing multivitamins within 4 hours of taking the levothyroxine tablet.  No known celiac disease.         Also the patient has a history of Graves' ophthalmopathy which he had at the time of his original diagnosis. His ophthalmopathy presented as proptosis and diplopia.  He received steroid courses during his early diagnosis.  He was referred to ophthalmology ([facility]) but never went there because he was always  feeling better by the time the referral was entered.      Today again he denied any new eye symptoms now. He denied any diplopia or worsening exophthalmos.           Problem List     Patient Active Problem List   Diagnosis    Allergic rhinitis    LEFT AC ARTHROSIS    Sensorineural hearing loss, asymmetrical    CARDIOVASCULAR SCREENING; LDL GOAL LESS THAN 160    Hyperthyroidism    Graves' eye disease    Hypothyroidism, postablative        Medications     Current Outpatient Medications   Medication    fexofenadine (ALLEGRA) 180 MG tablet    levothyroxine (SYNTHROID/LEVOTHROID) 112 MCG tablet     No current facility-administered medications for this visit.        Allergies     No Known Allergies    Medical / Surgical History     Past Medical History:   Diagnosis Date    Allergic rhinitis, cause unspecified     Hyperthyroidism 5/23/2013    Injury, other and unspecified, shoulder and upper arm     Left    Other postablative hypothyroidism 12/7/2013    Plantar fascial fibromatosis     Venereal disease, unspecified     Venereal warts     No past surgical history on file.    Social History     Social History     Socioeconomic History    Marital status:      Spouse name: Not on file    Number of children: Not on file    Years of education: Not on file    Highest education level: Not on file   Occupational History    Not on file   Tobacco Use    Smoking status: Never    Smokeless tobacco: Never   Vaping Use    Vaping Use: Never used   Substance and Sexual Activity    Alcohol use: Yes     Comment: 2 times weekly    Drug use: No    Sexual activity: Not on file   Other Topics Concern    Parent/sibling w/ CABG, MI or angioplasty before 65F 55M? Yes     Comment: father has 5 stents; starting at age 55   Social History Narrative    Not on file     Social Determinants of Health     Financial Resource Strain: Not on file   Food Insecurity: Not on file   Transportation Needs: Not on file   Physical Activity: Not on file  "  Stress: Not on file   Social Connections: Not on file   Interpersonal Safety: Not on file   Housing Stability: Not on file       Family History     Family History   Problem Relation Age of Onset    C.A.D. Father     Hypertension Father     Diabetes Father     Thyroid Disease Mother     Thyroid Disease Sister     Thyroid Disease Maternal Uncle     Thyroid Disease Other         two nieces    Breast Cancer No family hx of     Cancer - colorectal No family hx of      No thyroid disease  ROS     Limited ROS completed, pertinent positive and negative in HPI    Physical Exam   There were no vitals taken for this visit.       Labs/Imaging     Pertinent Labs were reviewed and updated in [facility] and reviewed.  Radiology Results were  reviewed and updated in EPIC and reviewed.    Summary of recent findings:   Lab Results   Component Value Date    A1C 5.5 01/15/2016       TSH   Date Value Ref Range Status   11/18/2022 9.58 (H) 0.30 - 4.20 uIU/mL Final   11/11/2021 18.25 (H) 0.40 - 4.00 mU/L Final   07/23/2020 6.78 (H) 0.40 - 4.00 mU/L Final   03/28/2019 4.67 (H) 0.40 - 4.00 mU/L Final   03/12/2018 4.69 (H) 0.40 - 4.00 mU/L Final   01/24/2018 7.87 (H) 0.40 - 4.00 mU/L Final   01/15/2016 2.92 0.40 - 4.00 mU/L Final     T4 Free   Date Value Ref Range Status   07/23/2020 1.10 0.76 - 1.46 ng/dL Final   03/28/2019 0.99 0.76 - 1.46 ng/dL Final   03/12/2018 1.01 0.76 - 1.46 ng/dL Final   01/24/2018 0.90 0.76 - 1.46 ng/dL Final   01/15/2016 1.05 0.76 - 1.46 ng/dL Final     Free T4   Date Value Ref Range Status   11/18/2022 1.14 0.90 - 1.70 ng/dL Final   11/11/2021 0.89 0.76 - 1.46 ng/dL Final       Creatinine   Date Value Ref Range Status   09/15/2018 1.17 0.66 - 1.25 mg/dL Final     Recent Labs   Lab Test 03/12/18  1501   CHOL 184   HDL 54   *   TRIG 133       No results found for: \"[id number]\", \"[id number]\", \"[id number]\"    I personally reviewed the patient's outside records from [facility] EMR. Summary of pertinent findings in " HPI.    Impression / Plan     1. Graves disease  2. Postablative hypothyroidism  3. Graves ophthalmopathy     He is taking 112 mcg of levothyroxine.  clinically euthyroid. Last TFT 11/2022. Due for labs    Plan: TSH with free T4 reflex  Will adjust lt4 accordingly  He does not use mychart and would like to be called with results.     # Graves' eye disease  Has never been seen in the [facility]' eye clinic. No sx concerning for active inflammation.  No new symptoms for the past 5 years.  He had multiple eye clinic referral but never went.      Test and/or medications prescribed today:  No orders of the defined types were placed in this encounter.    Follow up: 1 year    Jamal Barnett MD  Endocrinology, Diabetes and Metabolism  [facility]

## 2023-11-09 ENCOUNTER — LAB (OUTPATIENT)
Dept: LAB | Facility: CLINIC | Age: 63
End: 2023-11-09
Payer: COMMERCIAL

## 2023-11-09 DIAGNOSIS — E89.0 POSTABLATIVE HYPOTHYROIDISM: ICD-10-CM

## 2023-11-09 LAB — TSH SERPL DL<=0.005 MIU/L-ACNC: 2.99 UIU/ML (ref 0.3–4.2)

## 2023-11-09 PROCEDURE — 36415 COLL VENOUS BLD VENIPUNCTURE: CPT

## 2023-11-09 PROCEDURE — 84443 ASSAY THYROID STIM HORMONE: CPT

## 2023-11-13 ENCOUNTER — TELEPHONE (OUTPATIENT)
Dept: ENDOCRINOLOGY | Facility: CLINIC | Age: 63
End: 2023-11-13
Payer: COMMERCIAL

## 2023-11-13 NOTE — TELEPHONE ENCOUNTER
Karyn ALEXANDER spoke with this patient on 11/10 to schedule follow-up with Dr. Barnett in Nov 2024. Patient requested telephone visit at time of conversation.     Per clinical team we cannot schedule another telephone visit. Will have to be a virtual visit or in person.      Left Voicemail (1st Attempt) and Sent Mychart (1st Attempt) for the patient to call back and schedule the following:    Appointment type: Return Endocrine (virtual or in person)  Provider: Ericka  Return date: Nov 2024  Specialty phone number: [phone]  Additional appointment(s) needed: N/A  Additional Notes: N/A     Chetan De Jesus on 11/13/2023 at 10:30 AM

## 2023-11-21 ENCOUNTER — MYC REFILL (OUTPATIENT)
Dept: ENDOCRINOLOGY | Facility: CLINIC | Age: 63
End: 2023-11-21
Payer: COMMERCIAL

## 2023-11-21 DIAGNOSIS — E89.0 POSTABLATIVE HYPOTHYROIDISM: ICD-10-CM

## 2023-11-21 RX ORDER — LEVOTHYROXINE SODIUM 112 UG/1
112 TABLET ORAL DAILY
Qty: 90 TABLET | Refills: 1 | Status: SHIPPED | OUTPATIENT
Start: 2023-11-21 | End: 2024-05-16

## 2023-11-30 ENCOUNTER — TELEPHONE (OUTPATIENT)
Dept: ENDOCRINOLOGY | Facility: CLINIC | Age: 63
End: 2023-11-30
Payer: COMMERCIAL

## 2024-01-28 ENCOUNTER — HEALTH MAINTENANCE LETTER (OUTPATIENT)
Age: 64
End: 2024-01-28

## 2024-05-16 DIAGNOSIS — E89.0 POSTABLATIVE HYPOTHYROIDISM: ICD-10-CM

## 2024-05-20 RX ORDER — LEVOTHYROXINE SODIUM 112 UG/1
112 TABLET ORAL DAILY
Qty: 90 TABLET | Refills: 1 | Status: SHIPPED | OUTPATIENT
Start: 2024-05-20

## 2024-11-07 ENCOUNTER — MYC REFILL (OUTPATIENT)
Dept: ENDOCRINOLOGY | Facility: CLINIC | Age: 64
End: 2024-11-07
Payer: MEDICARE

## 2024-11-07 DIAGNOSIS — E89.0 POSTABLATIVE HYPOTHYROIDISM: ICD-10-CM

## 2024-11-08 RX ORDER — LEVOTHYROXINE SODIUM 112 UG/1
112 TABLET ORAL DAILY
Qty: 90 TABLET | Refills: 1 | Status: SHIPPED | OUTPATIENT
Start: 2024-11-08

## 2025-02-01 ENCOUNTER — HEALTH MAINTENANCE LETTER (OUTPATIENT)
Age: 65
End: 2025-02-01

## 2025-03-03 ENCOUNTER — APPOINTMENT (OUTPATIENT)
Dept: CT IMAGING | Facility: CLINIC | Age: 65
End: 2025-03-03
Attending: EMERGENCY MEDICINE
Payer: MEDICARE

## 2025-03-03 ENCOUNTER — HOSPITAL ENCOUNTER (EMERGENCY)
Facility: CLINIC | Age: 65
Discharge: HOME | End: 2025-03-03
Attending: EMERGENCY MEDICINE | Admitting: EMERGENCY MEDICINE
Payer: MEDICARE

## 2025-03-03 VITALS
TEMPERATURE: 98 F | HEART RATE: 63 BPM | OXYGEN SATURATION: 96 % | WEIGHT: 185 LBS | DIASTOLIC BLOOD PRESSURE: 74 MMHG | BODY MASS INDEX: 29.86 KG/M2 | SYSTOLIC BLOOD PRESSURE: 136 MMHG | RESPIRATION RATE: 18 BRPM

## 2025-03-03 DIAGNOSIS — R10.9 RIGHT FLANK PAIN: ICD-10-CM

## 2025-03-03 LAB
ALBUMIN SERPL BCG-MCNC: 4.4 G/DL (ref 3.5–5.2)
ALP SERPL-CCNC: 68 U/L (ref 40–150)
ALT SERPL W P-5'-P-CCNC: 40 U/L (ref 0–70)
ANION GAP SERPL CALCULATED.3IONS-SCNC: 9 MMOL/L (ref 7–15)
AST SERPL W P-5'-P-CCNC: 25 U/L (ref 0–45)
BASOPHILS # BLD AUTO: 0.1 10E3/UL (ref 0–0.2)
BASOPHILS NFR BLD AUTO: 1 %
BILIRUB SERPL-MCNC: 1.2 MG/DL
BUN SERPL-MCNC: 20.2 MG/DL (ref 8–23)
CALCIUM SERPL-MCNC: 10.1 MG/DL (ref 8.8–10.4)
CHLORIDE SERPL-SCNC: 102 MMOL/L (ref 98–107)
CREAT SERPL-MCNC: 1.29 MG/DL (ref 0.67–1.17)
EGFRCR SERPLBLD CKD-EPI 2021: 61 ML/MIN/1.73M2
EOSINOPHIL # BLD AUTO: 0.1 10E3/UL (ref 0–0.7)
EOSINOPHIL NFR BLD AUTO: 2 %
ERYTHROCYTE [DISTWIDTH] IN BLOOD BY AUTOMATED COUNT: 13 % (ref 10–15)
GLUCOSE SERPL-MCNC: 181 MG/DL (ref 70–99)
HCO3 SERPL-SCNC: 28 MMOL/L (ref 22–29)
HCT VFR BLD AUTO: 48.5 % (ref 40–53)
HGB BLD-MCNC: 16.4 G/DL (ref 13.3–17.7)
IMM GRANULOCYTES # BLD: 0 10E3/UL
IMM GRANULOCYTES NFR BLD: 0 %
LIPASE SERPL-CCNC: 27 U/L (ref 13–60)
LYMPHOCYTES # BLD AUTO: 2 10E3/UL (ref 0.8–5.3)
LYMPHOCYTES NFR BLD AUTO: 24 %
MCH RBC QN AUTO: 30.3 PG (ref 26.5–33)
MCHC RBC AUTO-ENTMCNC: 33.8 G/DL (ref 31.5–36.5)
MCV RBC AUTO: 90 FL (ref 78–100)
MONOCYTES # BLD AUTO: 0.5 10E3/UL (ref 0–1.3)
MONOCYTES NFR BLD AUTO: 6 %
NEUTROPHILS # BLD AUTO: 5.4 10E3/UL (ref 1.6–8.3)
NEUTROPHILS NFR BLD AUTO: 67 %
NRBC # BLD AUTO: 0 10E3/UL
NRBC BLD AUTO-RTO: 0 /100
PLATELET # BLD AUTO: 187 10E3/UL (ref 150–450)
POTASSIUM SERPL-SCNC: 4.3 MMOL/L (ref 3.4–5.3)
PROT SERPL-MCNC: 7.3 G/DL (ref 6.4–8.3)
RBC # BLD AUTO: 5.41 10E6/UL (ref 4.4–5.9)
SODIUM SERPL-SCNC: 139 MMOL/L (ref 135–145)
WBC # BLD AUTO: 8 10E3/UL (ref 4–11)

## 2025-03-03 PROCEDURE — 82374 ASSAY BLOOD CARBON DIOXIDE: CPT | Performed by: EMERGENCY MEDICINE

## 2025-03-03 PROCEDURE — 250N000011 HC RX IP 250 OP 636: Performed by: EMERGENCY MEDICINE

## 2025-03-03 PROCEDURE — 83690 ASSAY OF LIPASE: CPT | Performed by: EMERGENCY MEDICINE

## 2025-03-03 PROCEDURE — 36415 COLL VENOUS BLD VENIPUNCTURE: CPT | Performed by: EMERGENCY MEDICINE

## 2025-03-03 PROCEDURE — 82310 ASSAY OF CALCIUM: CPT | Performed by: EMERGENCY MEDICINE

## 2025-03-03 PROCEDURE — 99285 EMERGENCY DEPT VISIT HI MDM: CPT | Mod: 25 | Performed by: EMERGENCY MEDICINE

## 2025-03-03 PROCEDURE — 250N000009 HC RX 250: Performed by: EMERGENCY MEDICINE

## 2025-03-03 PROCEDURE — 74177 CT ABD & PELVIS W/CONTRAST: CPT

## 2025-03-03 PROCEDURE — 99284 EMERGENCY DEPT VISIT MOD MDM: CPT | Performed by: EMERGENCY MEDICINE

## 2025-03-03 PROCEDURE — 85004 AUTOMATED DIFF WBC COUNT: CPT | Performed by: EMERGENCY MEDICINE

## 2025-03-03 RX ORDER — IOPAMIDOL 755 MG/ML
91 INJECTION, SOLUTION INTRAVASCULAR ONCE
Status: COMPLETED | OUTPATIENT
Start: 2025-03-03 | End: 2025-03-03

## 2025-03-03 RX ORDER — OXYCODONE HYDROCHLORIDE 5 MG/1
5 TABLET ORAL EVERY 6 HOURS PRN
Qty: 6 TABLET | Refills: 0 | Status: SHIPPED | OUTPATIENT
Start: 2025-03-03

## 2025-03-03 RX ADMIN — IOPAMIDOL 91 ML: 755 INJECTION, SOLUTION INTRAVENOUS at 04:42

## 2025-03-03 RX ADMIN — SODIUM CHLORIDE 63 ML: 9 INJECTION, SOLUTION INTRAVENOUS at 04:42

## 2025-03-03 ASSESSMENT — COLUMBIA-SUICIDE SEVERITY RATING SCALE - C-SSRS
1. IN THE PAST MONTH, HAVE YOU WISHED YOU WERE DEAD OR WISHED YOU COULD GO TO SLEEP AND NOT WAKE UP?: NO
2. HAVE YOU ACTUALLY HAD ANY THOUGHTS OF KILLING YOURSELF IN THE PAST MONTH?: NO
6. HAVE YOU EVER DONE ANYTHING, STARTED TO DO ANYTHING, OR PREPARED TO DO ANYTHING TO END YOUR LIFE?: NO

## 2025-03-03 ASSESSMENT — ACTIVITIES OF DAILY LIVING (ADL)
ADLS_ACUITY_SCORE: 41
ADLS_ACUITY_SCORE: 41

## 2025-03-03 NOTE — ED TRIAGE NOTES
Pt presents with RLQ abdominal pain that began 1 week ago and has been coming and going. Per pt pain woke him from sleep tonight.     Triage Assessment (Adult)       Row Name 03/03/25 0402          Triage Assessment    Airway WDL WDL        Respiratory WDL    Respiratory WDL WDL        Skin Circulation/Temperature WDL    Skin Circulation/Temperature WDL WDL        Cardiac WDL    Cardiac WDL WDL        Peripheral/Neurovascular WDL    Peripheral Neurovascular WDL WDL        Cognitive/Neuro/Behavioral WDL    Cognitive/Neuro/Behavioral WDL WDL

## 2025-03-03 NOTE — LETTER
March 4, 2025      Temo Jalloh  [address] [place] [address]        Dear ,    We are writing to inform you of your test results.    {results letter list:[id number]}    Resulted Orders   CT Abdomen Pelvis w Contrast    Narrative    EXAM: CT ABDOMEN PELVIS W CONTRAST  LOCATION: [facility]  DATE: 3/3/2025    INDICATION: right lower abdominal pain  COMPARISON: None.  TECHNIQUE: CT scan of the abdomen and pelvis was performed following injection of IV contrast. Multiplanar reformats were obtained. Dose reduction techniques were used.  CONTRAST: 91mL Isovue 370    FINDINGS:   LOWER CHEST: Normal.    HEPATOBILIARY: Hepatic steatosis with probable sparing along the gallbladder fossa. Hepatic cysts. Subcentimeter hepatic hypodensity small for characterization.    PANCREAS: Normal.    SPLEEN: Normal.    ADRENAL GLANDS: Normal.    KIDNEYS/BLADDER: Left parapelvic cysts.    BOWEL: Normal caliber. Diverticulosis.    LYMPH NODES: Normal.    VASCULATURE: Atherosclerotic vascular calcification.    PELVIC ORGANS: Prominent prostate and prostate calcification.    MUSCULOSKELETAL: Left L5 pars defect. Mild anterolisthesis L4 on L5. Degenerative change osseous structures.      Impression    IMPRESSION:   1.  No inflammatory change, bowel obstruction or abscess.  2.  Diverticulosis.       If you have any questions or concerns, please call the clinic at the number listed above.       Sincerely,      Adrian Arroyo MD    Electronically signed

## 2025-03-03 NOTE — ED PROVIDER NOTES
History     Chief Complaint   Patient presents with    Abdominal Pain     HPI  Temo Jalloh is a 64 year old male who presents for abdominal pain.  The patient reports that he has had off-and-on right flank pain over the past week.  Pain is sharp and comes and goes, seems to be worse with certain positions.  No nausea, vomiting, or anorexia.  The pain woke him from sleep several hours prior to arrival, worse than it has been in the past.  No fevers or chills.  No dysuria, hematuria, urinary frequency.  Denies any prior abdominal surgeries.    Allergies:  No Known Allergies    Problem List:    Patient Active Problem List    Diagnosis Date Noted    Hypothyroidism, postablative 12/07/2013     Priority: Medium     Problem list name updated by automated process. Provider to review      Graves' eye disease 06/24/2013     Priority: Medium     extensive fam hx, eye disease and confirmed thyrotoxicosis        Hyperthyroidism 05/23/2013     Priority: Medium    Sensorineural hearing loss, asymmetrical 06/06/2011     Priority: Medium    CARDIOVASCULAR SCREENING; LDL GOAL LESS THAN 160 06/06/2011     Priority: Medium    Allergic rhinitis 05/02/2005     Priority: Medium     Problem list name updated by automated process. Provider to review      LEFT AC ARTHROSIS 05/02/2005     Priority: Medium     Left          Past Medical History:    Past Medical History:   Diagnosis Date    Allergic rhinitis, cause unspecified     Hyperthyroidism 5/23/2013    Injury, other and unspecified, shoulder and upper arm     Other postablative hypothyroidism 12/7/2013    Plantar fascial fibromatosis     Venereal disease, unspecified        Past Surgical History:    No past surgical history on file.    Family History:    Family History   Problem Relation Age of Onset    C.A.D. Father     Hypertension Father     Diabetes Father     Thyroid Disease Mother     Thyroid Disease Sister     Thyroid Disease Maternal Uncle     Thyroid Disease Other          two nieces    Breast Cancer No family hx of     Cancer - colorectal No family hx of        Social History:  Marital Status:   [2]  Social History     Tobacco Use    Smoking status: Never    Smokeless tobacco: Never   Vaping Use    Vaping status: Never Used   Substance Use Topics    Alcohol use: Yes     Comment: 2 times weekly    Drug use: No        Medications:    oxyCODONE (ROXICODONE) 5 MG tablet  fexofenadine (ALLEGRA) 180 MG tablet  levothyroxine (SYNTHROID/LEVOTHROID) 112 MCG tablet          Review of Systems    Physical Exam   BP: (!) 198/90  Pulse: 76  Temp: 98  F (36.7  C)  Resp: 18  Weight: 83.9 kg (185 lb)  SpO2: 97 %      Physical Exam  Constitutional:       General: He is not in acute distress.     Appearance: He is well-developed.   HENT:      Head: Normocephalic and atraumatic.   Cardiovascular:      Rate and Rhythm: Normal rate.   Pulmonary:      Effort: No respiratory distress.      Breath sounds: No stridor.   Abdominal:      Tenderness: There is abdominal tenderness in the right lower quadrant. There is no right CVA tenderness, left CVA tenderness, guarding or rebound.   Genitourinary:     Penis: Normal.    Skin:     General: Skin is warm and dry.   Neurological:      Mental Status: He is alert.         ED Course        Procedures              Critical Care time:  none     None         Results for orders placed or performed during the hospital encounter of 03/03/25 (from the past 24 hours)   Comprehensive metabolic panel   Result Value Ref Range    Sodium 139 135 - 145 mmol/L    Potassium 4.3 3.4 - 5.3 mmol/L    Carbon Dioxide (CO2) 28 22 - 29 mmol/L    Anion Gap 9 7 - 15 mmol/L    Urea Nitrogen 20.2 8.0 - 23.0 mg/dL    Creatinine 1.29 (H) 0.67 - 1.17 mg/dL    GFR Estimate 61 >60 mL/min/1.73m2    Calcium 10.1 8.8 - 10.4 mg/dL    Chloride 102 98 - 107 mmol/L    Glucose 181 (H) 70 - 99 mg/dL    Alkaline Phosphatase 68 40 - 150 U/L    AST 25 0 - 45 U/L    ALT 40 0 - 70 U/L    Protein Total 7.3  6.4 - 8.3 g/dL    Albumin 4.4 3.5 - 5.2 g/dL    Bilirubin Total 1.2 <=1.2 mg/dL   CBC with platelets, differential    Narrative    The following orders were created for panel order CBC with platelets, differential.  Procedure                               Abnormality         Status                     ---------                               -----------         ------                     CBC with platelets and d...[[id number]]                      Final result                 Please view results for these tests on the individual orders.   Lipase   Result Value Ref Range    Lipase 27 13 - 60 U/L   CBC with platelets and differential   Result Value Ref Range    WBC Count 8.0 4.0 - 11.0 10e3/uL    RBC Count 5.41 4.40 - 5.90 10e6/uL    Hemoglobin 16.4 13.3 - 17.7 g/dL    Hematocrit 48.5 40.0 - 53.0 %    MCV 90 78 - 100 fL    MCH 30.3 26.5 - 33.0 pg    MCHC 33.8 31.5 - 36.5 g/dL    RDW 13.0 10.0 - 15.0 %    Platelet Count 187 150 - 450 10e3/uL    % Neutrophils 67 %    % Lymphocytes 24 %    % Monocytes 6 %    % Eosinophils 2 %    % Basophils 1 %    % Immature Granulocytes 0 %    NRBCs per 100 WBC 0 <1 /100    Absolute Neutrophils 5.4 1.6 - 8.3 10e3/uL    Absolute Lymphocytes 2.0 0.8 - 5.3 10e3/uL    Absolute Monocytes 0.5 0.0 - 1.3 10e3/uL    Absolute Eosinophils 0.1 0.0 - 0.7 10e3/uL    Absolute Basophils 0.1 0.0 - 0.2 10e3/uL    Absolute Immature Granulocytes 0.0 <=0.4 10e3/uL    Absolute NRBCs 0.0 10e3/uL   CT Abdomen Pelvis w Contrast    Narrative    EXAM: CT ABDOMEN PELVIS W CONTRAST  LOCATION: [facility]  DATE: 3/3/2025    INDICATION: right lower abdominal pain  COMPARISON: None.  TECHNIQUE: CT scan of the abdomen and pelvis was performed following injection of IV contrast. Multiplanar reformats were obtained. Dose reduction techniques were used.  CONTRAST: 91mL Isovue 370    FINDINGS:   LOWER CHEST: Normal.    HEPATOBILIARY: Hepatic steatosis with probable sparing along the gallbladder  fossa. Hepatic cysts. Subcentimeter hepatic hypodensity small for characterization.    PANCREAS: Normal.    SPLEEN: Normal.    ADRENAL GLANDS: Normal.    KIDNEYS/BLADDER: Left parapelvic cysts.    BOWEL: Normal caliber. Diverticulosis.    LYMPH NODES: Normal.    VASCULATURE: Atherosclerotic vascular calcification.    PELVIC ORGANS: Prominent prostate and prostate calcification.    MUSCULOSKELETAL: Left L5 pars defect. Mild anterolisthesis L4 on L5. Degenerative change osseous structures.      Impression    IMPRESSION:   1.  No inflammatory change, bowel obstruction or abscess.  2.  Diverticulosis.       Medications   CT Saline (63 mLs Intravenous $Given 3/3/25 0442)   iopamidol (ISOVUE-370) solution 91 mL (91 mLs Intravenous $Given 3/3/25 0442)       Assessments & Plan (with Medical Decision Making)   64-year-old male presents with right lower quadrant abdominal pain.  No signs of hernia on exam.  I was presentation, possible kidney stone versus appendicitis as a cause of his symptoms.  White blood cell count is 8.0.  Hemoglobin 16.4.  Electrolytes are within normal limits.  CT of the abdomen and pelvis obtained, images interpreted independently as well as radiology read reviewed, no signs of kidney stone, appendicitis, obstruction.  Unclear cause of the patient's symptoms at this time but so far the tests are reassuring.  Lipase and LFTs are normal.  No signs of hepatitis or pancreatitis.  At this time he is safe to discharge with reassurance and instructions to drink plenty fluids, use ice or heat for 10 to 15 minutes at a time every couple of hours, return if worse.  The patient is in agreement with this plan.    I have reviewed the nursing notes.    I have reviewed the findings, diagnosis, plan and need for follow up with the patient.           Medical Decision Making  The patient's presentation was of high complexity (an acute health issue posing potential threat to life or bodily function).    The patient's  evaluation involved:  ordering and/or review of 3+ test(s) in this encounter (see separate area of note for details)  independent interpretation of testing performed by another health professional (see separate area of note for details)    The patient's management necessitated moderate risk (prescription drug management including medications given in the ED).        New Prescriptions    OXYCODONE (ROXICODONE) 5 MG TABLET    Take 1 tablet (5 mg) by mouth every 6 hours as needed for severe pain.       Final diagnoses:   Right flank pain       3/3/2025   [facility] EMERGENCY DEPT       Adrian Arroyo MD  03/03/25 [id number]

## 2025-03-03 NOTE — DISCHARGE INSTRUCTIONS
No signs of any kidney stone or appendicitis causing your symptoms.  I am not certain what is causing the pain but it does not appear to be dangerous at this time.  Continue your home medications.  Use acetaminophen and ibuprofen as needed.  Return for increasing pain, repeated vomiting, worsening symptoms, or other concerns.  Otherwise follow-up in clinic.

## 2025-03-24 ENCOUNTER — TELEPHONE (OUTPATIENT)
Dept: FAMILY MEDICINE | Facility: CLINIC | Age: 65
End: 2025-03-24
Payer: MEDICARE

## 2025-04-29 ENCOUNTER — OFFICE VISIT (OUTPATIENT)
Dept: ENDOCRINOLOGY | Facility: CLINIC | Age: 65
End: 2025-04-29
Payer: MEDICARE

## 2025-04-29 VITALS
DIASTOLIC BLOOD PRESSURE: 88 MMHG | HEIGHT: 66 IN | WEIGHT: 192.4 LBS | SYSTOLIC BLOOD PRESSURE: 137 MMHG | HEART RATE: 82 BPM | BODY MASS INDEX: 30.92 KG/M2 | OXYGEN SATURATION: 96 %

## 2025-04-29 DIAGNOSIS — E05.00 GRAVES' EYE DISEASE: Primary | ICD-10-CM

## 2025-04-29 DIAGNOSIS — E89.0 POSTABLATIVE HYPOTHYROIDISM: ICD-10-CM

## 2025-04-29 LAB
T4 FREE SERPL-MCNC: 1.31 NG/DL (ref 0.9–1.7)
TSH SERPL DL<=0.005 MIU/L-ACNC: 5.45 UIU/ML (ref 0.3–4.2)

## 2025-04-29 RX ORDER — LEVOTHYROXINE SODIUM 112 UG/1
112 TABLET ORAL DAILY
Qty: 90 TABLET | Refills: 1 | Status: SHIPPED | OUTPATIENT
Start: 2025-04-29

## 2025-04-29 NOTE — PROGRESS NOTES
Endocrinology Clinic Visit     Chief Complaint     Chief Complaint   Patient presents with    Thyroid Disease     Graves       History of Present Illness     Temo Jalloh is a 60 year old male who is having a visit for follow up of graves and hypothyroidism. Last visit 11/2023.    Initially presented to eye clinic with eye lid swelling, then found to have thyrotoxicosis and diagnosed with Graves' disease in his initial visit with us in 2013 with elevated TSI and positive family history. He was treated with 22 mCi of radioactive iodine in August 2013, shortly after diagnosis.  He developed post ablative hypothyroidism and was started on levothyroxine.      His TFT 11/2021 TSH 18.25 and FT4 0.89 while on LT4 75 mcg daily. I increased it to 88 mcg daily. On 11/2022 TSH 9.5, l increased to 112 mcg daily. He does not take any other meds. He reported no new sx or concerns. Currently takes levothyroxine on empty stomach and waits at least 30 minutes before eating.  Does not take calcium or iron containing multivitamins within 4 hours of taking the levothyroxine tablet.  No known celiac disease.      Also the patient has a history of Graves' ophthalmopathy which he had at the time of his original diagnosis. His ophthalmopathy presented as proptosis and diplopia with lateral gaze.  He received steroid courses during his early diagnosis.  He was referred to ophthalmology ([facility]) but never went there because he felt symptoms were manageable.      Today he mentions the double vision with lateral gaze. Not worsening. No headaches, dizziness, or worsening exophthalmos.  Wondering about Tepezza he saw ads for on TV. Has not seen an eye doctor since his initial diagnosis. No concerns with temperature intolerance, dry skin, weight gain, fatigue, constipation.        Problem List     Patient Active Problem List   Diagnosis    Allergic rhinitis    LEFT AC ARTHROSIS    Sensorineural hearing loss, asymmetrical     CARDIOVASCULAR SCREENING; LDL GOAL LESS THAN 160    Hyperthyroidism    Graves' eye disease    Hypothyroidism, postablative        Medications     Current Outpatient Medications   Medication Sig Dispense Refill    fexofenadine (ALLEGRA) 180 MG tablet Take 1 tablet (180 mg) by mouth daily 30 tablet 0    levothyroxine (SYNTHROID/LEVOTHROID) 112 MCG tablet Take 1 tablet (112 mcg) by mouth daily. 90 tablet 1     No current facility-administered medications for this visit.        Allergies     No Known Allergies    Medical / Surgical History     Past Medical History:   Diagnosis Date    Allergic rhinitis, cause unspecified     Hyperthyroidism 5/23/2013    Injury, other and unspecified, shoulder and upper arm     Left    Other postablative hypothyroidism 12/7/2013    Plantar fascial fibromatosis     Venereal disease, unspecified     Venereal warts     No past surgical history on file.    Social History     Social History     Socioeconomic History    Marital status:      Spouse name: Not on file    Number of children: Not on file    Years of education: Not on file    Highest education level: Not on file   Occupational History    Not on file   Tobacco Use    Smoking status: Never    Smokeless tobacco: Never   Vaping Use    Vaping status: Never Used   Substance and Sexual Activity    Alcohol use: Yes     Comment: 2 times weekly    Drug use: No    Sexual activity: Not on file   Other Topics Concern    Parent/sibling w/ CABG, MI or angioplasty before 65F 55M? Yes     Comment: father has 5 stents; starting at age 55   Social History Narrative    Not on file     Social Drivers of Health     Financial Resource Strain: Not on file   Food Insecurity: Not on file   Transportation Needs: Not on file   Physical Activity: Not on file   Stress: Not on file   Social Connections: Unknown (6/18/2024)    Received from [facility]    Social Connections     Frequency of Communication with Friends and  "Family: Not on file   Interpersonal Safety: Not on file   Housing Stability: Not on file       Family History     Family History   Problem Relation Age of Onset    C.A.D. Father     Hypertension Father     Diabetes Father     Thyroid Disease Mother     Thyroid Disease Sister     Thyroid Disease Maternal Uncle     Thyroid Disease Other         two nieces    Breast Cancer No family hx of     Cancer - colorectal No family hx of        ROS     Limited ROS completed, pertinent positive and negative in HPI    Physical Exam   /88 (BP Location: Left arm, Patient Position: Sitting, Cuff Size: Adult Large)   Pulse 82   Ht 1.669 m (5' 5.71\")   Wt 87.3 kg (192 lb 6.4 oz)   SpO2 96%   BMI 31.33 kg/m       GENERAL APPEARANCE: pleasant, resting comfortably in bed, no acute distress  HEENT: Normocephalic and atraumatic. Exophthalmous of bilateral eyes, possible slight redness. No lid lad.  LUNGS: Clear to auscultation bilaterally without wheezes, rhonchi, or rales.  HEART: RRR with normal S1 and S2.  ABDOMEN: Soft, nontender, nondistended.   SKIN: No rashes on visible skin  NEUROLOGIC: Moving all extremities equally     Labs/Imaging     Pertinent Labs were reviewed and updated in [employer] and reviewed.  Radiology Results were  reviewed and updated in EPIC and reviewed.    Summary of recent findings:   Lab Results   Component Value Date    A1C 5.5 01/15/2016       TSH   Date Value Ref Range Status   11/09/2023 2.99 0.30 - 4.20 uIU/mL Final   11/18/2022 9.58 (H) 0.30 - 4.20 uIU/mL Final   11/11/2021 18.25 (H) 0.40 - 4.00 mU/L Final   07/23/2020 6.78 (H) 0.40 - 4.00 mU/L Final   03/28/2019 4.67 (H) 0.40 - 4.00 mU/L Final   03/12/2018 4.69 (H) 0.40 - 4.00 mU/L Final   01/24/2018 7.87 (H) 0.40 - 4.00 mU/L Final   01/15/2016 2.92 0.40 - 4.00 mU/L Final     T4 Free   Date Value Ref Range Status   07/23/2020 1.10 0.76 - 1.46 ng/dL Final   03/28/2019 0.99 0.76 - 1.46 ng/dL Final   03/12/2018 1.01 0.76 - 1.46 ng/dL Final   01/24/2018 " 0.90 0.76 - 1.46 ng/dL Final   01/15/2016 1.05 0.76 - 1.46 ng/dL Final     Free T4   Date Value Ref Range Status   11/18/2022 1.14 0.90 - 1.70 ng/dL Final   11/11/2021 0.89 0.76 - 1.46 ng/dL Final       Creatinine   Date Value Ref Range Status   03/03/2025 1.29 (H) 0.67 - 1.17 mg/dL Final   09/15/2018 1.17 0.66 - 1.25 mg/dL Final       I personally reviewed the patient's outside records from "[employer] EMR. Summary of pertinent findings in HPI.    Impression / Plan     1. Graves disease  2. Postablative hypothyroidism  3. Graves ophthalmopathy     He is taking 112 mcg of levothyroxine. Clinically euthyroid. Last TSH 2.99 (11/2023), due for labs. Interested in ophthalmology referral to discuss treatment options for his exophthalmous and diplopia with lateral gaze.     Plan:   TSH with free T4 reflex  Will adjust levothyroxine accordingly  Ophthalmology consult       Test and/or medications prescribed today:  Orders Placed This Encounter   Procedures    TSH with free T4 reflex    Adult Eye  Referral       Follow up: 1 year      Patient was seen and discussed with staff attending, Dr. Barnett, who agrees with the assessment and plan as above     Juany Murphy  Internal Medicine Resident, PGY3    Physician Attestation   I, Jamal Barnett MD, saw this patient with the resident and agree with the resident's findings and plan of care as documented in the resident s note.           Jamal Barnett MD  Date of Service (when I saw the patient): 04/29/25     The longitudinal plan of care for the diagnosis(es)/condition(s) as documented were addressed during this visit. Due to the added complexity in care, I will continue to support Temo in the subsequent management and with ongoing continuity of care.

## 2025-04-29 NOTE — LETTER
4/29/2025      Temo Jalloh  [address]  [facility] [address]      Dear Colleague,    Thank you for referring your patient, Temo Jalloh, to the [facility]. Please see a copy of my visit note below.    Endocrinology Clinic Visit     Chief Complaint     Chief Complaint   Patient presents with     Thyroid Disease     Graves       History of Present Illness     Temo Jalloh is a 60 year old male who is having a visit for follow up of graves and hypothyroidism. Last visit 11/2023.    Initially presented to eye clinic with eye lid swelling, then found to have thyrotoxicosis and diagnosed with Graves' disease in his initial visit with us in 2013 with elevated TSI and positive family history. He was treated with 22 mCi of radioactive iodine in August 2013, shortly after diagnosis.  He developed post ablative hypothyroidism and was started on levothyroxine.      His TFT 11/2021 TSH 18.25 and FT4 0.89 while on LT4 75 mcg daily. I increased it to 88 mcg daily. On 11/2022 TSH 9.5, l increased to 112 mcg daily. He does not take any other meds. He reported no new sx or concerns. Currently takes levothyroxine on empty stomach and waits at least 30 minutes before eating.  Does not take calcium or iron containing multivitamins within 4 hours of taking the levothyroxine tablet.  No known celiac disease.      Also the patient has a history of Graves' ophthalmopathy which he had at the time of his original diagnosis. His ophthalmopathy presented as proptosis and diplopia with lateral gaze.  He received steroid courses during his early diagnosis.  He was referred to ophthalmology (Graves' eye clinic) but never went there because he felt symptoms were manageable.      Today he mentions the double vision with lateral gaze. Not worsening. No headaches, dizziness, or worsening exophthalmos.  Wondering about Tepezza he saw ads for on TV. Has not seen an eye doctor since his initial  diagnosis. No concerns with temperature intolerance, dry skin, weight gain, fatigue, constipation.        Problem List     Patient Active Problem List   Diagnosis     Allergic rhinitis     LEFT AC ARTHROSIS     Sensorineural hearing loss, asymmetrical     CARDIOVASCULAR SCREENING; LDL GOAL LESS THAN 160     Hyperthyroidism     Graves' eye disease     Hypothyroidism, postablative        Medications     Current Outpatient Medications   Medication Sig Dispense Refill     fexofenadine (ALLEGRA) 180 MG tablet Take 1 tablet (180 mg) by mouth daily 30 tablet 0     levothyroxine (SYNTHROID/LEVOTHROID) 112 MCG tablet Take 1 tablet (112 mcg) by mouth daily. 90 tablet 1     No current facility-administered medications for this visit.        Allergies     No Known Allergies    Medical / Surgical History     Past Medical History:   Diagnosis Date     Allergic rhinitis, cause unspecified      Hyperthyroidism 5/23/2013     Injury, other and unspecified, shoulder and upper arm     Left     Other postablative hypothyroidism 12/7/2013     Plantar fascial fibromatosis      Venereal disease, unspecified     Venereal warts     No past surgical history on file.    Social History     Social History     Socioeconomic History     Marital status:      Spouse name: Not on file     Number of children: Not on file     Years of education: Not on file     Highest education level: Not on file   Occupational History     Not on file   Tobacco Use     Smoking status: Never     Smokeless tobacco: Never   Vaping Use     Vaping status: Never Used   Substance and Sexual Activity     Alcohol use: Yes     Comment: 2 times weekly     Drug use: No     Sexual activity: Not on file   Other Topics Concern     Parent/sibling w/ CABG, MI or angioplasty before 65F 55M? Yes     Comment: father has 5 stents; starting at age 55   Social History Narrative     Not on file     Social Drivers of Health     Financial Resource Strain: Not on file   Food Insecurity:  "Not on file   Transportation Needs: Not on file   Physical Activity: Not on file   Stress: Not on file   Social Connections: Unknown (6/18/2024)    Received from [employer]    Social Connections      Frequency of Communication with Friends and Family: Not on file   Interpersonal Safety: Not on file   Housing Stability: Not on file       Family History     Family History   Problem Relation Age of Onset     C.A.D. Father      Hypertension Father      Diabetes Father      Thyroid Disease Mother      Thyroid Disease Sister      Thyroid Disease Maternal Uncle      Thyroid Disease Other         two nieces     Breast Cancer No family hx of      Cancer - colorectal No family hx of        ROS     Limited ROS completed, pertinent positive and negative in HPI    Physical Exam   /88 (BP Location: Left arm, Patient Position: Sitting, Cuff Size: Adult Large)   Pulse 82   Ht 1.669 m (5' 5.71\")   Wt 87.3 kg (192 lb 6.4 oz)   SpO2 96%   BMI 31.33 kg/m       GENERAL APPEARANCE: pleasant, resting comfortably in bed, no acute distress  HEENT: Normocephalic and atraumatic. Exophthalmous of bilateral eyes, possible slight redness. No lid lad.  LUNGS: Clear to auscultation bilaterally without wheezes, rhonchi, or rales.  HEART: RRR with normal S1 and S2.  ABDOMEN: Soft, nontender, nondistended.   SKIN: No rashes on visible skin  NEUROLOGIC: Moving all extremities equally     Labs/Imaging     Pertinent Labs were reviewed and updated in [facility] and reviewed.  Radiology Results were  reviewed and updated in EPIC and reviewed.    Summary of recent findings:   Lab Results   Component Value Date    A1C 5.5 01/15/2016       TSH   Date Value Ref Range Status   11/09/2023 2.99 0.30 - 4.20 uIU/mL Final   11/18/2022 9.58 (H) 0.30 - 4.20 uIU/mL Final   11/11/2021 18.25 (H) 0.40 - 4.00 mU/L Final   07/23/2020 6.78 (H) 0.40 - 4.00 mU/L Final   03/28/2019 4.67 (H) 0.40 - 4.00 mU/L Final   03/12/2018 4.69 (H) " 0.40 - 4.00 mU/L Final   01/24/2018 7.87 (H) 0.40 - 4.00 mU/L Final   01/15/2016 2.92 0.40 - 4.00 mU/L Final     T4 Free   Date Value Ref Range Status   07/23/2020 1.10 0.76 - 1.46 ng/dL Final   03/28/2019 0.99 0.76 - 1.46 ng/dL Final   03/12/2018 1.01 0.76 - 1.46 ng/dL Final   01/24/2018 0.90 0.76 - 1.46 ng/dL Final   01/15/2016 1.05 0.76 - 1.46 ng/dL Final     Free T4   Date Value Ref Range Status   11/18/2022 1.14 0.90 - 1.70 ng/dL Final   11/11/2021 0.89 0.76 - 1.46 ng/dL Final       Creatinine   Date Value Ref Range Status   03/03/2025 1.29 (H) 0.67 - 1.17 mg/dL Final   09/15/2018 1.17 0.66 - 1.25 mg/dL Final       I personally reviewed the patient's outside records from [employer]. Summary of pertinent findings in HPI.    Impression / Plan     1. Graves disease  2. Postablative hypothyroidism  3. Graves ophthalmopathy     He is taking 112 mcg of levothyroxine. Clinically euthyroid. Last TSH 2.99 (11/2023), due for labs. Interested in ophthalmology referral to discuss treatment options for his exophthalmous and diplopia with lateral gaze.     Plan:   TSH with free T4 reflex  Will adjust levothyroxine accordingly  Ophthalmology consult       Test and/or medications prescribed today:  Orders Placed This Encounter   Procedures     TSH with free T4 reflex     Adult Eye  Referral       Follow up: 1 year      Patient was seen and discussed with staff attending, Dr. Barnett, who agrees with the assessment and plan as above     Juany Murphy  Internal Medicine Resident, PGY3    Physician Attestation  I, Jamal Barnett MD, saw this patient with the resident and agree with the resident's findings and plan of care as documented in the resident s note.           Jamal Barnett MD  Date of Service (when I saw the patient): 04/29/25     The longitudinal plan of care for the diagnosis(es)/condition(s) as documented were addressed during this visit. Due to the added complexity in care, I will continue to  support Temo in the subsequent management and with ongoing continuity of care.           Again, thank you for allowing me to participate in the care of your patient.        Sincerely,        Jamal Barnett MD    Electronically signed

## 2025-04-29 NOTE — NURSING NOTE
"Temo Jalloh's goals for this visit include:   Chief Complaint   Patient presents with    Thyroid Disease     Graves     He requests these members of his care team be copied on today's visit information: No    PCP: Sherin Crews    Referring Provider:  Referred Self, MD  No address on file    /88 (BP Location: Left arm, Patient Position: Sitting, Cuff Size: Adult Large)   Pulse 82   Ht 1.669 m (5' 5.71\")   Wt 87.3 kg (192 lb 6.4 oz)   SpO2 96%   BMI 31.33 kg/m      Do you need any medication refills at today's visit? Yes    "

## 2025-04-30 ENCOUNTER — PATIENT OUTREACH (OUTPATIENT)
Dept: CARE COORDINATION | Facility: CLINIC | Age: 65
End: 2025-04-30
Payer: MEDICARE

## 2025-05-13 ENCOUNTER — OFFICE VISIT (OUTPATIENT)
Dept: ORTHOPEDICS | Facility: CLINIC | Age: 65
End: 2025-05-13
Payer: MEDICARE

## 2025-05-13 DIAGNOSIS — M76.02 GLUTEAL TENDINITIS OF LEFT BUTTOCK: ICD-10-CM

## 2025-05-13 DIAGNOSIS — M70.62 TROCHANTERIC BURSITIS, LEFT HIP: Primary | ICD-10-CM

## 2025-05-13 PROCEDURE — 99204 OFFICE O/P NEW MOD 45 MIN: CPT | Mod: 25 | Performed by: FAMILY MEDICINE

## 2025-05-13 PROCEDURE — 20610 DRAIN/INJ JOINT/BURSA W/O US: CPT | Mod: LT | Performed by: FAMILY MEDICINE

## 2025-05-13 RX ORDER — TRIAMCINOLONE ACETONIDE 40 MG/ML
40 INJECTION, SUSPENSION INTRA-ARTICULAR; INTRAMUSCULAR
Status: COMPLETED | OUTPATIENT
Start: 2025-05-13 | End: 2025-05-13

## 2025-05-13 RX ORDER — LIDOCAINE HYDROCHLORIDE 10 MG/ML
2 INJECTION, SOLUTION INFILTRATION; PERINEURAL
Status: COMPLETED | OUTPATIENT
Start: 2025-05-13 | End: 2025-05-13

## 2025-05-13 RX ORDER — BUPIVACAINE HYDROCHLORIDE 5 MG/ML
2 INJECTION, SOLUTION PERINEURAL
Status: COMPLETED | OUTPATIENT
Start: 2025-05-13 | End: 2025-05-13

## 2025-05-13 RX ADMIN — TRIAMCINOLONE ACETONIDE 40 MG: 40 INJECTION, SUSPENSION INTRA-ARTICULAR; INTRAMUSCULAR at 10:56

## 2025-05-13 RX ADMIN — BUPIVACAINE HYDROCHLORIDE 2 ML: 5 INJECTION, SOLUTION PERINEURAL at 10:56

## 2025-05-13 RX ADMIN — LIDOCAINE HYDROCHLORIDE 2 ML: 10 INJECTION, SOLUTION INFILTRATION; PERINEURAL at 10:56

## 2025-05-13 NOTE — LETTER
2025      Temo Jalloh  [address]  [facility] [address]      Dear Colleague,    Thank you for referring your patient, Temo Jalloh, to the [facility] [place]. Please see a copy of my visit note below.    Temo Jalloh  :  1960  DOS: 2025  MRN: [id number]    Sports Medicine Clinic Visit    PCP: Sherin Crews    Temo Jalloh is a 64 year old male who is seen as a self referral presenting with chronic left hip pain.    Injury: Gradual onset of waxing & waning pain over the past 1+ years, that is more consistent over the past 3 months.  Pain located over left deep posterior lateral hip, nonradiating.  Denies any radiating symptoms at this time.  Additional Features:  Positive: weakness.  Symptoms are better with Ice and Rest, changing positions.  Symptoms are worse with: rolling over in bed, prolonged walking/sitting.  Other evaluation and/or treatments so far consists of: Ice and Rest.  Recent imaging completed: CT abdomen/pelvis completed 3/3/2025.  Prior History of related problems: none    Social History: retired    Review of Systems  Musculoskeletal: as above  Remainder of review of systems is negative including constitutional, CV, pulmonary, GI, Skin and Neurologic except as noted in HPI or medical history.    Past Medical History:   Diagnosis Date     Allergic rhinitis, cause unspecified      Hyperthyroidism 2013     Injury, other and unspecified, shoulder and upper arm     Left     Other postablative hypothyroidism 2013     Plantar fascial fibromatosis      Venereal disease, unspecified     Venereal warts     No past surgical history on file.  Family History   Problem Relation Age of Onset     C.A.D. Father      Hypertension Father      Diabetes Father      Thyroid Disease Mother      Thyroid Disease Sister      Thyroid Disease Maternal Uncle      Thyroid Disease Other         two nieces     Breast Cancer No family hx  of      Cancer - colorectal No family hx of        Objective  There were no vitals taken for this visit.    General: healthy, alert and in no distress    HEENT: no scleral icterus or conjunctival erythema   Skin: no suspicious lesions or rash. No jaundice.   CV: regular rhythm by palpation, 2+ distal pulses, no pedal edema    Resp: normal respiratory effort without conversational dyspnea   Psych: normal mood and affect    Gait: nonantalgic, appropriate coordination and balance   Neuro: normal light touch sensory exam of the extremities. Motor strength as noted below     Left hip exam    Inspection:        no edema or ecchymosis in hip area    ROM:       Full active and passive ROM     Strength:        flexion 5/5       extension 5/5       abduction 5/5       adduction 5/5    Tender:        greater trochanter most focal       Gluteus medius mild/moderate    Non Tender:        remainder of hip area       illiac crest       ASIS       SI joint    Sensation:        grossly intact in hip and thigh    Skin:       well perfused       capillary refill brisk    Special Tests:        neg (-) JOE       neg (-) FADIR       neg (-) scour       neg (-) Aurora    Radiology  Results for orders placed or performed during the hospital encounter of 03/03/25   CT Abdomen Pelvis w Contrast    Narrative    EXAM: CT ABDOMEN PELVIS W CONTRAST  LOCATION: [facility]  DATE: 3/3/2025    INDICATION: right lower abdominal pain  COMPARISON: None.  TECHNIQUE: CT scan of the abdomen and pelvis was performed following injection of IV contrast. Multiplanar reformats were obtained. Dose reduction techniques were used.  CONTRAST: 91mL Isovue 370    FINDINGS:   LOWER CHEST: Normal.    HEPATOBILIARY: Hepatic steatosis with probable sparing along the gallbladder fossa. Hepatic cysts. Subcentimeter hepatic hypodensity small for characterization.    PANCREAS: Normal.    SPLEEN: Normal.    ADRENAL GLANDS:  Normal.    KIDNEYS/BLADDER: Left parapelvic cysts.    BOWEL: Normal caliber. Diverticulosis.    LYMPH NODES: Normal.    VASCULATURE: Atherosclerotic vascular calcification.    PELVIC ORGANS: Prominent prostate and prostate calcification.    MUSCULOSKELETAL: Left L5 pars defect. Mild anterolisthesis L4 on L5. Degenerative change osseous structures.      Impression    IMPRESSION:   1.  No inflammatory change, bowel obstruction or abscess.  2.  Diverticulosis.     Large Joint Injection/Arthocentesis: L greater trochanteric bursa    Date/Time: 5/13/2025 10:56 AM    Performed by: Jasbir Hall DO  Authorized by: Jasbir Hall DO    Indications:  Pain  Needle Size:  25 G  Guidance: landmark guided    Approach:  Posterolateral  Location:  Hip      Site:  L greater trochanteric bursa  Medications:  2 mL lidocaine 1 %; 40 mg triamcinolone 40 MG/ML; 2 mL BUPivacaine 0.5 %  Outcome:  Tolerated well, no immediate complications  Procedure discussed: discussed risks, benefits, and alternatives    Consent Given by:  Patient  Timeout: timeout called immediately prior to procedure    Prep: patient was prepped and draped in usual sterile fashion        Assessment:  1. Trochanteric bursitis, left hip    2. Gluteal tendinitis of left buttock        Plan:  Discussed the assessment with the patient.  Follow up: 3 weeks if not improved  Increasing left hip pain without injury, clinically consistent with trochanteric bursitis  Strength and stability goals reviewed longer term  PT offered and available if desired, HEP provided, reviewed lower impact core-strengthening activity options  XR images independently visualized and reviewed with patient today in clinic  Signs of RASHAUN present but no intraarticular pain on exam today, reassuring  After review of the alternatives, relative risks, goals and limitations of corticosteroid injection, the patient elected to proceed with diagnostic and hopefully therapeutic CSI to the  left trochanteric bursa today  Expectations and goals of CSI reviewed  Often 2-3 days for steroid effect, and can take up to two weeks for maximum effect  We discussed modified progressive pain-free activity as tolerated  Do not overuse in first two weeks if feeling better due to concern for vulnerability while steroid is working  Supportive care reviewed  All questions were answered today  Contact us with additional questions or concerns  Signs and sx of concern reviewed      Jasbir Hall DO, CAQ  Sports Medicine Physician  [facility]          Disclaimer: This note consists of symbols derived from keyboarding, dictation and/or voice recognition software. As a result, there may be errors in the script that have gone undetected. Please consider this when interpreting information found in this chart.    Again, thank you for allowing me to participate in the care of your patient.        Sincerely,        Jasbir Hall DO    Electronically signed

## 2025-05-13 NOTE — PROGRESS NOTES
Temo Jalloh  :  1960  DOS: 2025  MRN: [id number]    Sports Medicine Clinic Visit    PCP: Sherin Crews    Temo Jalloh is a 64 year old male who is seen as a self referral presenting with chronic left hip pain.    Injury: Gradual onset of waxing & waning pain over the past 1+ years, that is more consistent over the past 3 months.  Pain located over left deep posterior lateral hip, nonradiating.  Denies any radiating symptoms at this time.  Additional Features:  Positive: weakness.  Symptoms are better with Ice and Rest, changing positions.  Symptoms are worse with: rolling over in bed, prolonged walking/sitting.  Other evaluation and/or treatments so far consists of: Ice and Rest.  Recent imaging completed: CT abdomen/pelvis completed 3/3/2025.  Prior History of related problems: none    Social History: retired    Review of Systems  Musculoskeletal: as above  Remainder of review of systems is negative including constitutional, CV, pulmonary, GI, Skin and Neurologic except as noted in HPI or medical history.    Past Medical History:   Diagnosis Date    Allergic rhinitis, cause unspecified     Hyperthyroidism 2013    Injury, other and unspecified, shoulder and upper arm     Left    Other postablative hypothyroidism 2013    Plantar fascial fibromatosis     Venereal disease, unspecified     Venereal warts     No past surgical history on file.  Family History   Problem Relation Age of Onset    C.A.D. Father     Hypertension Father     Diabetes Father     Thyroid Disease Mother     Thyroid Disease Sister     Thyroid Disease Maternal Uncle     Thyroid Disease Other         two nieces    Breast Cancer No family hx of     Cancer - colorectal No family hx of        Objective  There were no vitals taken for this visit.    General: healthy, alert and in no distress    HEENT: no scleral icterus or conjunctival erythema   Skin: no suspicious lesions or rash. No jaundice.   CV: regular  rhythm by palpation, 2+ distal pulses, no pedal edema    Resp: normal respiratory effort without conversational dyspnea   Psych: normal mood and affect    Gait: nonantalgic, appropriate coordination and balance   Neuro: normal light touch sensory exam of the extremities. Motor strength as noted below     Left hip exam    Inspection:        no edema or ecchymosis in hip area    ROM:       Full active and passive ROM     Strength:        flexion 5/5       extension 5/5       abduction 5/5       adduction 5/5    Tender:        greater trochanter most focal       Gluteus medius mild/moderate    Non Tender:        remainder of hip area       illiac crest       ASIS       SI joint    Sensation:        grossly intact in hip and thigh    Skin:       well perfused       capillary refill brisk    Special Tests:        neg (-) JOE       neg (-) FADIR       neg (-) scour       neg (-) Aurora    Radiology  Results for orders placed or performed during the hospital encounter of 03/03/25   CT Abdomen Pelvis w Contrast    Narrative    EXAM: CT ABDOMEN PELVIS W CONTRAST  LOCATION: [facility]  DATE: 3/3/2025    INDICATION: right lower abdominal pain  COMPARISON: None.  TECHNIQUE: CT scan of the abdomen and pelvis was performed following injection of IV contrast. Multiplanar reformats were obtained. Dose reduction techniques were used.  CONTRAST: 91mL Isovue 370    FINDINGS:   LOWER CHEST: Normal.    HEPATOBILIARY: Hepatic steatosis with probable sparing along the gallbladder fossa. Hepatic cysts. Subcentimeter hepatic hypodensity small for characterization.    PANCREAS: Normal.    SPLEEN: Normal.    ADRENAL GLANDS: Normal.    KIDNEYS/BLADDER: Left parapelvic cysts.    BOWEL: Normal caliber. Diverticulosis.    LYMPH NODES: Normal.    VASCULATURE: Atherosclerotic vascular calcification.    PELVIC ORGANS: Prominent prostate and prostate calcification.    MUSCULOSKELETAL: Left L5 pars defect. Mild  anterolisthesis L4 on L5. Degenerative change osseous structures.      Impression    IMPRESSION:   1.  No inflammatory change, bowel obstruction or abscess.  2.  Diverticulosis.     Large Joint Injection/Arthocentesis: L greater trochanteric bursa    Date/Time: 5/13/2025 10:56 AM    Performed by: Jasbir Hall DO  Authorized by: Jasbir Hall DO    Indications:  Pain  Needle Size:  25 G  Guidance: landmark guided    Approach:  Posterolateral  Location:  Hip      Site:  L greater trochanteric bursa  Medications:  2 mL lidocaine 1 %; 40 mg triamcinolone 40 MG/ML; 2 mL BUPivacaine 0.5 %  Outcome:  Tolerated well, no immediate complications  Procedure discussed: discussed risks, benefits, and alternatives    Consent Given by:  Patient  Timeout: timeout called immediately prior to procedure    Prep: patient was prepped and draped in usual sterile fashion        Assessment:  1. Trochanteric bursitis, left hip    2. Gluteal tendinitis of left buttock        Plan:  Discussed the assessment with the patient.  Follow up: 3 weeks if not improved  Increasing left hip pain without injury, clinically consistent with trochanteric bursitis  Strength and stability goals reviewed longer term  PT offered and available if desired, HEP provided, reviewed lower impact core-strengthening activity options  XR images independently visualized and reviewed with patient today in clinic  Signs of RASHAUN present but no intraarticular pain on exam today, reassuring  After review of the alternatives, relative risks, goals and limitations of corticosteroid injection, the patient elected to proceed with diagnostic and hopefully therapeutic CSI to the left trochanteric bursa today  Expectations and goals of CSI reviewed  Often 2-3 days for steroid effect, and can take up to two weeks for maximum effect  We discussed modified progressive pain-free activity as tolerated  Do not overuse in first two weeks if feeling better due to  concern for vulnerability while steroid is working  Supportive care reviewed  All questions were answered today  Contact us with additional questions or concerns  Signs and sx of concern reviewed      Jasbir Hlal DO, KALE  Sports Medicine Physician  [facility]          Disclaimer: This note consists of symbols derived from keyboarding, dictation and/or voice recognition software. As a result, there may be errors in the script that have gone undetected. Please consider this when interpreting information found in this chart.

## 2025-08-16 ENCOUNTER — APPOINTMENT (OUTPATIENT)
Dept: GENERAL RADIOLOGY | Facility: CLINIC | Age: 65
End: 2025-08-16
Attending: EMERGENCY MEDICINE
Payer: MEDICARE

## 2025-08-16 ENCOUNTER — HOSPITAL ENCOUNTER (EMERGENCY)
Facility: CLINIC | Age: 65
Discharge: HOME | End: 2025-08-16
Attending: EMERGENCY MEDICINE | Admitting: EMERGENCY MEDICINE
Payer: MEDICARE

## 2025-08-16 VITALS
BODY MASS INDEX: 30.12 KG/M2 | TEMPERATURE: 98.5 F | WEIGHT: 185 LBS | OXYGEN SATURATION: 99 % | HEART RATE: 65 BPM | DIASTOLIC BLOOD PRESSURE: 95 MMHG | RESPIRATION RATE: 14 BRPM | SYSTOLIC BLOOD PRESSURE: 147 MMHG

## 2025-08-16 DIAGNOSIS — R07.9 ACUTE CHEST PAIN: Primary | ICD-10-CM

## 2025-08-16 LAB
ALBUMIN SERPL BCG-MCNC: 4.4 G/DL (ref 3.5–5.2)
ALP SERPL-CCNC: 64 U/L (ref 40–150)
ALT SERPL W P-5'-P-CCNC: 46 U/L (ref 0–70)
ANION GAP SERPL CALCULATED.3IONS-SCNC: 11 MMOL/L (ref 7–15)
APTT PPP: 29 SECONDS (ref 22–38)
AST SERPL W P-5'-P-CCNC: 28 U/L (ref 0–45)
BASOPHILS # BLD AUTO: 0.05 10E3/UL (ref 0–0.2)
BASOPHILS NFR BLD AUTO: 0.8 %
BILIRUB SERPL-MCNC: 1.2 MG/DL
BUN SERPL-MCNC: 18.1 MG/DL (ref 8–23)
CALCIUM SERPL-MCNC: 9.3 MG/DL (ref 8.8–10.4)
CHLORIDE SERPL-SCNC: 98 MMOL/L (ref 98–107)
CREAT SERPL-MCNC: 1.27 MG/DL (ref 0.67–1.17)
D DIMER PPP FEU-MCNC: 0.27 UG/ML FEU (ref 0–0.5)
EGFRCR SERPLBLD CKD-EPI 2021: 62 ML/MIN/1.73M2
EOSINOPHIL # BLD AUTO: 0.09 10E3/UL (ref 0–0.7)
EOSINOPHIL NFR BLD AUTO: 1.4 %
ERYTHROCYTE [DISTWIDTH] IN BLOOD BY AUTOMATED COUNT: 13.2 % (ref 10–15)
GLUCOSE SERPL-MCNC: 140 MG/DL (ref 70–99)
HCO3 SERPL-SCNC: 28 MMOL/L (ref 22–29)
HCT VFR BLD AUTO: 47.6 % (ref 40–53)
HGB BLD-MCNC: 16.3 G/DL (ref 13.3–17.7)
IMM GRANULOCYTES # BLD: <0.03 10E3/UL
IMM GRANULOCYTES NFR BLD: 0.2 %
INR PPP: 0.95 (ref 0.85–1.15)
LIPASE SERPL-CCNC: 24 U/L (ref 13–60)
LYMPHOCYTES # BLD AUTO: 1.29 10E3/UL (ref 0.8–5.3)
LYMPHOCYTES NFR BLD AUTO: 20.7 %
MCH RBC QN AUTO: 30.8 PG (ref 26.5–33)
MCHC RBC AUTO-ENTMCNC: 34.2 G/DL (ref 31.5–36.5)
MCV RBC AUTO: 89.8 FL (ref 78–100)
MONOCYTES # BLD AUTO: 0.34 10E3/UL (ref 0–1.3)
MONOCYTES NFR BLD AUTO: 5.5 %
NEUTROPHILS # BLD AUTO: 4.44 10E3/UL (ref 1.6–8.3)
NEUTROPHILS NFR BLD AUTO: 71.4 %
NRBC # BLD AUTO: <0.03 10E3/UL
NRBC BLD AUTO-RTO: 0 /100
NT-PROBNP SERPL-MCNC: <36 PG/ML (ref 0–229)
PLATELET # BLD AUTO: 181 10E3/UL (ref 150–450)
POTASSIUM SERPL-SCNC: 4 MMOL/L (ref 3.4–5.3)
PROT SERPL-MCNC: 6.9 G/DL (ref 6.4–8.3)
PROTHROMBIN TIME: 12.6 SECONDS (ref 11.8–14.8)
RBC # BLD AUTO: 5.3 10E6/UL (ref 4.4–5.9)
SODIUM SERPL-SCNC: 137 MMOL/L (ref 135–145)
T4 FREE SERPL-MCNC: 1.43 NG/DL (ref 0.9–1.7)
TROPONIN T SERPL HS-MCNC: <6 NG/L
TROPONIN T SERPL HS-MCNC: <6 NG/L
TSH SERPL DL<=0.005 MIU/L-ACNC: 9.26 UIU/ML (ref 0.3–4.2)
WBC # BLD AUTO: 6.22 10E3/UL (ref 4–11)

## 2025-08-16 PROCEDURE — 36415 COLL VENOUS BLD VENIPUNCTURE: CPT | Performed by: EMERGENCY MEDICINE

## 2025-08-16 PROCEDURE — 83690 ASSAY OF LIPASE: CPT | Performed by: EMERGENCY MEDICINE

## 2025-08-16 PROCEDURE — 85379 FIBRIN DEGRADATION QUANT: CPT | Performed by: EMERGENCY MEDICINE

## 2025-08-16 PROCEDURE — 85730 THROMBOPLASTIN TIME PARTIAL: CPT | Performed by: EMERGENCY MEDICINE

## 2025-08-16 PROCEDURE — 85610 PROTHROMBIN TIME: CPT | Performed by: EMERGENCY MEDICINE

## 2025-08-16 PROCEDURE — 99285 EMERGENCY DEPT VISIT HI MDM: CPT | Mod: 25 | Performed by: EMERGENCY MEDICINE

## 2025-08-16 PROCEDURE — 71046 X-RAY EXAM CHEST 2 VIEWS: CPT

## 2025-08-16 PROCEDURE — 93005 ELECTROCARDIOGRAM TRACING: CPT

## 2025-08-16 PROCEDURE — 84484 ASSAY OF TROPONIN QUANT: CPT | Performed by: EMERGENCY MEDICINE

## 2025-08-16 PROCEDURE — 85004 AUTOMATED DIFF WBC COUNT: CPT | Performed by: EMERGENCY MEDICINE

## 2025-08-16 PROCEDURE — 83880 ASSAY OF NATRIURETIC PEPTIDE: CPT | Performed by: EMERGENCY MEDICINE

## 2025-08-16 PROCEDURE — 84443 ASSAY THYROID STIM HORMONE: CPT | Performed by: EMERGENCY MEDICINE

## 2025-08-16 PROCEDURE — 84155 ASSAY OF PROTEIN SERUM: CPT | Performed by: EMERGENCY MEDICINE

## 2025-08-16 PROCEDURE — 84439 ASSAY OF FREE THYROXINE: CPT | Mod: 59 | Performed by: EMERGENCY MEDICINE

## 2025-08-16 RX ORDER — LANSOPRAZOLE 30 MG/1
30 CAPSULE, DELAYED RELEASE ORAL DAILY
Qty: 30 CAPSULE | Refills: 0 | Status: SHIPPED | OUTPATIENT
Start: 2025-08-16

## 2025-08-16 ASSESSMENT — ENCOUNTER SYMPTOMS
CHEST TIGHTNESS: 0
NECK STIFFNESS: 0
FLANK PAIN: 0
LIGHT-HEADEDNESS: 0
NUMBNESS: 0
ABDOMINAL PAIN: 0
SEIZURES: 0
BLOOD IN STOOL: 0
AGITATION: 0
CONFUSION: 0
DYSURIA: 0
DIARRHEA: 0
EYE REDNESS: 0
EYE DISCHARGE: 0
NERVOUS/ANXIOUS: 0
NAUSEA: 1
WEAKNESS: 0
VOMITING: 0
PALPITATIONS: 0
MYALGIAS: 0
FREQUENCY: 0
BACK PAIN: 0
HEADACHES: 0
SORE THROAT: 0
SHORTNESS OF BREATH: 1
CHILLS: 0
FEVER: 0
COUGH: 0

## 2025-08-16 ASSESSMENT — ACTIVITIES OF DAILY LIVING (ADL)
ADLS_ACUITY_SCORE: 41

## 2025-08-16 ASSESSMENT — COLUMBIA-SUICIDE SEVERITY RATING SCALE - C-SSRS
1. IN THE PAST MONTH, HAVE YOU WISHED YOU WERE DEAD OR WISHED YOU COULD GO TO SLEEP AND NOT WAKE UP?: NO
6. HAVE YOU EVER DONE ANYTHING, STARTED TO DO ANYTHING, OR PREPARED TO DO ANYTHING TO END YOUR LIFE?: NO
2. HAVE YOU ACTUALLY HAD ANY THOUGHTS OF KILLING YOURSELF IN THE PAST MONTH?: NO

## 2025-08-17 LAB
ATRIAL RATE - MUSE: 77 BPM
DIASTOLIC BLOOD PRESSURE - MUSE: NORMAL MMHG
INTERPRETATION ECG - MUSE: NORMAL
P AXIS - MUSE: 38 DEGREES
PR INTERVAL - MUSE: 216 MS
QRS DURATION - MUSE: 148 MS
QT - MUSE: 396 MS
QTC - MUSE: 448 MS
R AXIS - MUSE: -10 DEGREES
SYSTOLIC BLOOD PRESSURE - MUSE: NORMAL MMHG
T AXIS - MUSE: 138 DEGREES
VENTRICULAR RATE- MUSE: 77 BPM

## 2025-09-03 ENCOUNTER — HOSPITAL ENCOUNTER (OUTPATIENT)
Dept: NUCLEAR MEDICINE | Facility: CLINIC | Age: 65
Setting detail: NUCLEAR MEDICINE
Discharge: HOME | End: 2025-09-03
Attending: EMERGENCY MEDICINE
Payer: MEDICARE

## 2025-09-03 ENCOUNTER — HOSPITAL ENCOUNTER (OUTPATIENT)
Dept: CARDIOLOGY | Facility: CLINIC | Age: 65
Setting detail: NUCLEAR MEDICINE
Discharge: HOME | End: 2025-09-03
Attending: EMERGENCY MEDICINE
Payer: MEDICARE

## 2025-09-03 DIAGNOSIS — R07.9 ACUTE CHEST PAIN: ICD-10-CM

## 2025-09-03 LAB
CV STRESS MAX HR HE: 101
NUC STRESS EJECTION FRACTION: 58 %
RATE PRESSURE PRODUCT: NORMAL
STRESS ECHO BASELINE DIASTOLIC HE: 86
STRESS ECHO BASELINE HR: 61 BPM
STRESS ECHO BASELINE SYSTOLIC BP: 132
STRESS ECHO CALCULATED PERCENT HR: 66 %
STRESS ECHO LAST STRESS DIASTOLIC BP: 80
STRESS ECHO LAST STRESS SYSTOLIC BP: 130
STRESS ECHO TARGET HR: 154

## 2025-09-03 PROCEDURE — A9502 TC99M TETROFOSMIN: HCPCS | Performed by: EMERGENCY MEDICINE

## 2025-09-03 PROCEDURE — 78452 HT MUSCLE IMAGE SPECT MULT: CPT | Mod: 26 | Performed by: INTERNAL MEDICINE

## 2025-09-03 PROCEDURE — 93017 CV STRESS TEST TRACING ONLY: CPT

## 2025-09-03 PROCEDURE — 78452 HT MUSCLE IMAGE SPECT MULT: CPT

## 2025-09-03 PROCEDURE — 343N000001 HC RX 343 MED OP 636: Performed by: EMERGENCY MEDICINE

## 2025-09-03 PROCEDURE — 93016 CV STRESS TEST SUPVJ ONLY: CPT | Performed by: INTERNAL MEDICINE

## 2025-09-03 PROCEDURE — 250N000011 HC RX IP 250 OP 636: Performed by: EMERGENCY MEDICINE

## 2025-09-03 RX ORDER — REGADENOSON 0.08 MG/ML
0.4 INJECTION, SOLUTION INTRAVENOUS ONCE
Status: COMPLETED | OUTPATIENT
Start: 2025-09-03 | End: 2025-09-03

## 2025-09-03 RX ADMIN — TETROFOSMIN 30.8 MILLICURIE: 1.38 INJECTION, POWDER, LYOPHILIZED, FOR SOLUTION INTRAVENOUS at 09:50

## 2025-09-03 RX ADMIN — REGADENOSON 0.4 MG: 0.08 INJECTION, SOLUTION INTRAVENOUS at 09:48

## 2025-09-03 RX ADMIN — TETROFOSMIN 9.8 MILLICURIE: 1.38 INJECTION, POWDER, LYOPHILIZED, FOR SOLUTION INTRAVENOUS at 08:40

## (undated) RX ORDER — REGADENOSON 0.08 MG/ML
INJECTION, SOLUTION INTRAVENOUS
Status: DISPENSED
Start: 2025-09-03